# Patient Record
Sex: MALE | Race: WHITE | NOT HISPANIC OR LATINO | Employment: FULL TIME | URBAN - METROPOLITAN AREA
[De-identification: names, ages, dates, MRNs, and addresses within clinical notes are randomized per-mention and may not be internally consistent; named-entity substitution may affect disease eponyms.]

---

## 2017-10-24 ENCOUNTER — LAB VISIT (OUTPATIENT)
Dept: LAB | Facility: HOSPITAL | Age: 32
End: 2017-10-24
Attending: FAMILY MEDICINE
Payer: COMMERCIAL

## 2017-10-24 ENCOUNTER — OFFICE VISIT (OUTPATIENT)
Dept: INTERNAL MEDICINE | Facility: CLINIC | Age: 32
End: 2017-10-24
Payer: COMMERCIAL

## 2017-10-24 VITALS
HEIGHT: 71 IN | OXYGEN SATURATION: 98 % | HEART RATE: 82 BPM | WEIGHT: 170.19 LBS | BODY MASS INDEX: 23.83 KG/M2 | DIASTOLIC BLOOD PRESSURE: 86 MMHG | TEMPERATURE: 97 F | SYSTOLIC BLOOD PRESSURE: 136 MMHG | RESPIRATION RATE: 18 BRPM

## 2017-10-24 DIAGNOSIS — F41.9 ANXIETY: ICD-10-CM

## 2017-10-24 DIAGNOSIS — Z00.00 ROUTINE HEALTH MAINTENANCE: ICD-10-CM

## 2017-10-24 DIAGNOSIS — Z00.00 ROUTINE HEALTH MAINTENANCE: Primary | ICD-10-CM

## 2017-10-24 LAB
ALBUMIN SERPL BCP-MCNC: 4.3 G/DL
ALP SERPL-CCNC: 96 U/L
ALT SERPL W/O P-5'-P-CCNC: 42 U/L
ANION GAP SERPL CALC-SCNC: 11 MMOL/L
AST SERPL-CCNC: 22 U/L
BILIRUB SERPL-MCNC: 0.4 MG/DL
BUN SERPL-MCNC: 10 MG/DL
CALCIUM SERPL-MCNC: 9.3 MG/DL
CHLORIDE SERPL-SCNC: 104 MMOL/L
CO2 SERPL-SCNC: 26 MMOL/L
CREAT SERPL-MCNC: 1 MG/DL
EST. GFR  (AFRICAN AMERICAN): >60 ML/MIN/1.73 M^2
EST. GFR  (NON AFRICAN AMERICAN): >60 ML/MIN/1.73 M^2
GLUCOSE SERPL-MCNC: 91 MG/DL
POTASSIUM SERPL-SCNC: 4.1 MMOL/L
PROT SERPL-MCNC: 7.8 G/DL
SODIUM SERPL-SCNC: 141 MMOL/L
TSH SERPL DL<=0.005 MIU/L-ACNC: 1.55 UIU/ML

## 2017-10-24 PROCEDURE — 80061 LIPID PANEL: CPT

## 2017-10-24 PROCEDURE — 86592 SYPHILIS TEST NON-TREP QUAL: CPT

## 2017-10-24 PROCEDURE — 84443 ASSAY THYROID STIM HORMONE: CPT | Mod: PO

## 2017-10-24 PROCEDURE — 99385 PREV VISIT NEW AGE 18-39: CPT | Mod: S$GLB,,, | Performed by: FAMILY MEDICINE

## 2017-10-24 PROCEDURE — 86703 HIV-1/HIV-2 1 RESULT ANTBDY: CPT

## 2017-10-24 PROCEDURE — 80053 COMPREHEN METABOLIC PANEL: CPT | Mod: PO

## 2017-10-24 PROCEDURE — 36415 COLL VENOUS BLD VENIPUNCTURE: CPT | Mod: PO

## 2017-10-24 PROCEDURE — 99999 PR PBB SHADOW E&M-NEW PATIENT-LVL III: CPT | Mod: PBBFAC,,, | Performed by: FAMILY MEDICINE

## 2017-10-24 RX ORDER — DEXTROAMPHETAMINE SACCHARATE, AMPHETAMINE ASPARTATE MONOHYDRATE, DEXTROAMPHETAMINE SULFATE AND AMPHETAMINE SULFATE 3.75; 3.75; 3.75; 3.75 MG/1; MG/1; MG/1; MG/1
15 CAPSULE, EXTENDED RELEASE ORAL EVERY MORNING
Qty: 30 CAPSULE | Refills: 0 | Status: SHIPPED | OUTPATIENT
Start: 2017-10-24 | End: 2017-11-06

## 2017-10-24 RX ORDER — BUPROPION HYDROCHLORIDE 150 MG/1
TABLET, EXTENDED RELEASE ORAL
COMMUNITY
Start: 2017-05-23 | End: 2017-10-24

## 2017-10-24 NOTE — MEDICAL/APP STUDENT
Subjective:       Patient ID: Matthew Lazo is a 32 y.o. unknown.    Chief Complaint: Employment Physical and Memory Deficits (unable to complete task given and unable to stay foused)    New patient presents to establish care and complete yearly wellness exam/physical for work.     Also c/o issues focusing at work which have been pointed out and are becoming an issue with his work. Describes situations where he feels like he is easily distracted and then can't remember what he was initially working on. States he has always some issues focusing and completing tasks but it has become more pronounced. Reports he recently tried his sister's Adderall and found that he was able to focus and concentrate more easily and things felt more clear.     Patient has a 17 pack year history. No longer smokes cigarettes but is currently vaping with nicotine. Reports he has cut back on the amount of nicotine he uses but is still working to stop completely. Wellbutrin has helped in the past with cigarettes. He has also used nicotine gum as a substitute. Previously prescribed Wellbutrin to help with smoking cessation. He no longer takes it bid but takes it occasionally when having a difficult day. Has tried to stop taking it in the past and has been irritable if he does not have access to nicotine.       Review of Systems   Constitutional: Negative for chills and fever.   HENT: Negative for sneezing and sore throat.    Eyes: Negative for pain and visual disturbance.   Respiratory: Negative for chest tightness and shortness of breath.    Cardiovascular: Negative for chest pain and palpitations.   Gastrointestinal: Negative for abdominal pain and nausea.   Endocrine: Negative for cold intolerance and heat intolerance.   Genitourinary: Negative for dysuria and urgency.   Musculoskeletal: Negative for arthralgias and myalgias.   Skin: Negative for color change and rash.   Neurological: Negative for dizziness and headaches.    Psychiatric/Behavioral: Positive for agitation and decreased concentration. Negative for confusion and suicidal ideas. The patient is nervous/anxious.        Objective:      Physical Exam   Constitutional: He is oriented to person, place, and time. He appears well-developed and well-nourished.   HENT:   Head: Normocephalic and atraumatic.   Eyes: EOM are normal. Pupils are equal, round, and reactive to light.   Neck: Normal range of motion. Neck supple.   Cardiovascular: Normal rate, regular rhythm, normal heart sounds and intact distal pulses.    Pulmonary/Chest: Effort normal and breath sounds normal.   Abdominal: Soft. Bowel sounds are normal.   Musculoskeletal: Normal range of motion.   Neurological: He is alert and oriented to person, place, and time.   Skin: Skin is warm and dry.   Psychiatric: His behavior is normal. Judgment and thought content normal. His mood appears anxious. He expresses no homicidal and no suicidal ideation.   Difficulties concentrating and completing tasks He is inattentive.       Assessment:       1. Routine health maintenance    2. Anxiety        Plan:       # Difficulty Concentrating/ Adult ADD  - Unsure if issues are due to depression or causing some anxiety and depression   - Depression screening performed --> Moderate depression. Will repeat in one month after Adderall trial   - 1 month trial of Adderall 15mg ER    # Routine Health Maintenance  - CMP today  - TSH today  - Lipid panel today  - RPR, HIV 1/2 today     Patient discussed with and seen by Dr. Derian Laughlin, MS4

## 2017-10-24 NOTE — PROGRESS NOTES
Subjective:       Patient ID: Matthew Lazo is a 32 y.o. unknown.    Chief Complaint: Employment Physical and Memory Deficits (unable to complete task given and unable to stay foused)    HPI    New patient presents to establish care and complete yearly wellness exam/physical for work.      Also c/o issues focusing at work which have been pointed out and are becoming an issue with his work. Describes situations where he feels like he is easily distracted and then can't remember what he was initially working on. States he has always some issues focusing and completing tasks but it has become more pronounced. Reports he recently tried his sister's Adderall and found that he was able to focus and concentrate more easily and things felt more clear.      Patient has a 17 pack year history. No longer smokes cigarettes but is currently vaping with nicotine. Reports he has cut back on the amount of nicotine he uses but is still working to stop completely. Wellbutrin has helped in the past with cigarettes. He has also used nicotine gum as a substitute. Previously prescribed Wellbutrin to help with smoking cessation. He no longer takes it bid but takes it occasionally when having a difficult day. Has tried to stop taking it in the past and has been irritable if he does not have access to nicotine.      Little interest or pleasure in doing things 3 - Nearly every day    Feeling down, depressed, or hopeless 2 - More than half the days    Trouble falling or staying asleep, or sleeping too much 3 - Nearly every day    Feeling tired or having little energy 1 - Several days    Poor appetite or overeating 0 - Not at all    Feeling bad about yourself - or that you are a failure or have let yourself or your family down. 2 - More than half the days    Trouble concentrating on things, such as reading the newpaper or watching television. 3 - Nearly every day    Moving or speaking so slowly that other people could have noticed?  Or the  "opposite - being so fidgety or restless that you have been moving around a lot more than ususal 3 - Nearly every day    Thoughts that you would be better off dead or of hurting yourself in some way. 0 - Not at all    If you checked off any problems, how difficult have these problems made it for you to do your work, take care of things at hoe, or get along with other people? Very difficult        There is no problem list on file for this patient.      Family History   Problem Relation Age of Onset    No Known Problems Mother     Hypertension Father     Alcohol abuse Father     ADD / ADHD Sister     Cancer Paternal Grandfather     Leukemia Paternal Grandfather      History reviewed. No pertinent surgical history.      Current Outpatient Prescriptions:     buPROPion (WELLBUTRIN SR) 150 MG TBSR 12 hr tablet, TAKE 1 TABLET BY MOUTH TWICE DAILY, Disp: , Rfl:     Review of Systems    Constitutional: Negative for chills and fever.   HENT: Negative for sneezing and sore throat.    Eyes: Negative for pain and visual disturbance.   Respiratory: Negative for chest tightness and shortness of breath.    Cardiovascular: Negative for chest pain and palpitations.   Gastrointestinal: Negative for abdominal pain and nausea.   Endocrine: Negative for cold intolerance and heat intolerance.   Genitourinary: Negative for dysuria and urgency.   Musculoskeletal: Negative for arthralgias and myalgias.   Skin: Negative for color change and rash.   Neurological: Negative for dizziness and headaches.   Psychiatric/Behavioral: Positive for agitation and decreased concentration. Negative for confusion and suicidal ideas. The patient is nervous/anxious.      Objective:   /86 (BP Location: Left arm, Patient Position: Sitting, BP Method: Large (Automatic))   Pulse 82   Temp 96.5 °F (35.8 °C) (Tympanic)   Resp 18   Ht 5' 11" (1.803 m)   Wt 77.2 kg (170 lb 3.1 oz)   SpO2 98%   BMI 23.74 kg/m²      Physical Exam    Constitutional: " He is oriented to person, place, and time. He appears well-developed and well-nourished.   HENT:   Head: Normocephalic and atraumatic.   Eyes: EOM are normal. Pupils are equal, round, and reactive to light.   Neck: Normal range of motion. Neck supple.   Cardiovascular: Normal rate, regular rhythm, normal heart sounds and intact distal pulses.    Pulmonary/Chest: Effort normal and breath sounds normal.   Abdominal: Soft. Bowel sounds are normal.   Musculoskeletal: Normal range of motion.   Neurological: He is alert and oriented to person, place, and time.   Skin: Skin is warm and dry.   Psychiatric: His behavior is normal. Judgment and thought content normal. His mood appears anxious. He expresses no homicidal and no suicidal ideation.   Difficulties concentrating and completing tasks He is inattentive.     Assessment & Plan     1. Routine health maintenance  Getting routine screening for annual exam.  No significant problems identified from physical exam nor his history.  We will need to discuss smoking cessation in the future but first we will focus on the inattention and anxiety issues.  - Comprehensive metabolic panel; Future  - Lipid panel; Future  - HIV-1 and HIV-2 antibodies; Future  - RPR; Future    2. Anxiety  After discussing with him in detail it seems that his in attention is causing him to be anxious about losing his job and depressed.  Since he has had some recent success with using Adderall and this seems to be more the foundational problem, will do extended release of 15 mg daily for the next month to see if this is improving his performance at work.  May need to consider treating underlying depression or anxiety if he is not making progress.  - TSH; Future

## 2017-10-25 LAB
CHOLEST SERPL-MCNC: 219 MG/DL
CHOLEST/HDLC SERPL: 3.9 {RATIO}
HDLC SERPL-MCNC: 56 MG/DL
HDLC SERPL: 25.6 %
HIV 1+2 AB+HIV1 P24 AG SERPL QL IA: NEGATIVE
LDLC SERPL CALC-MCNC: 128.4 MG/DL
NONHDLC SERPL-MCNC: 163 MG/DL
RPR SER QL: NORMAL
TRIGL SERPL-MCNC: 173 MG/DL

## 2017-11-03 ENCOUNTER — PATIENT MESSAGE (OUTPATIENT)
Dept: INTERNAL MEDICINE | Facility: CLINIC | Age: 32
End: 2017-11-03

## 2017-11-06 ENCOUNTER — PATIENT MESSAGE (OUTPATIENT)
Dept: INTERNAL MEDICINE | Facility: CLINIC | Age: 32
End: 2017-11-06

## 2017-11-06 RX ORDER — DEXTROAMPHETAMINE SACCHARATE, AMPHETAMINE ASPARTATE, DEXTROAMPHETAMINE SULFATE AND AMPHETAMINE SULFATE 5; 5; 5; 5 MG/1; MG/1; MG/1; MG/1
1 TABLET ORAL 2 TIMES DAILY PRN
Qty: 60 TABLET | Refills: 0 | Status: SHIPPED | OUTPATIENT
Start: 2017-11-06 | End: 2017-11-30 | Stop reason: SDUPTHER

## 2017-11-30 ENCOUNTER — PATIENT MESSAGE (OUTPATIENT)
Dept: INTERNAL MEDICINE | Facility: CLINIC | Age: 32
End: 2017-11-30

## 2017-11-30 RX ORDER — DEXTROAMPHETAMINE SACCHARATE, AMPHETAMINE ASPARTATE, DEXTROAMPHETAMINE SULFATE AND AMPHETAMINE SULFATE 5; 5; 5; 5 MG/1; MG/1; MG/1; MG/1
1 TABLET ORAL 2 TIMES DAILY PRN
Qty: 60 TABLET | Refills: 0 | Status: SHIPPED | OUTPATIENT
Start: 2017-11-30 | End: 2017-12-27 | Stop reason: SDUPTHER

## 2017-12-04 ENCOUNTER — PATIENT OUTREACH (OUTPATIENT)
Dept: ADMINISTRATIVE | Facility: HOSPITAL | Age: 32
End: 2017-12-04

## 2017-12-05 ENCOUNTER — OFFICE VISIT (OUTPATIENT)
Dept: INTERNAL MEDICINE | Facility: CLINIC | Age: 32
End: 2017-12-05
Payer: COMMERCIAL

## 2017-12-05 VITALS
WEIGHT: 160.06 LBS | DIASTOLIC BLOOD PRESSURE: 80 MMHG | HEART RATE: 89 BPM | SYSTOLIC BLOOD PRESSURE: 130 MMHG | HEIGHT: 72 IN | TEMPERATURE: 97 F | RESPIRATION RATE: 16 BRPM | OXYGEN SATURATION: 99 % | BODY MASS INDEX: 21.68 KG/M2

## 2017-12-05 DIAGNOSIS — F98.8 ATTENTION DEFICIT DISORDER (ADD) WITHOUT HYPERACTIVITY: Primary | ICD-10-CM

## 2017-12-05 PROCEDURE — 99213 OFFICE O/P EST LOW 20 MIN: CPT | Mod: S$GLB,,, | Performed by: FAMILY MEDICINE

## 2017-12-05 PROCEDURE — 99999 PR PBB SHADOW E&M-EST. PATIENT-LVL III: CPT | Mod: PBBFAC,,, | Performed by: FAMILY MEDICINE

## 2017-12-05 NOTE — PROGRESS NOTES
"Subjective:       Patient ID: Matthew Lazo is a 32 y.o. male.    Chief Complaint: Follow-up    HPI  Patient here today to follow-up on initiation of Adderall.  He has had a tremendous improvement in work performance both appreciated by himself and his boss.  He feels like he is getting things done much more efficiently and is having less anxiety about work.  He would like to continue the same dose of medication due to this improvement.    There is no problem list on file for this patient.      Family History   Problem Relation Age of Onset    No Known Problems Mother     Hypertension Father     Alcohol abuse Father     ADD / ADHD Sister     Cancer Paternal Grandfather     Leukemia Paternal Grandfather      History reviewed. No pertinent surgical history.      Current Outpatient Prescriptions:     dextroamphetamine-amphetamine (ADDERALL) 20 mg tablet, Take 1 tablet by mouth 2 (two) times daily as needed., Disp: 60 tablet, Rfl: 0    Review of Systems   Constitutional: Negative for activity change and unexpected weight change.   HENT: Negative for hearing loss, rhinorrhea and trouble swallowing.    Eyes: Negative for discharge and visual disturbance.   Respiratory: Negative for chest tightness and wheezing.    Cardiovascular: Negative for chest pain and palpitations.   Gastrointestinal: Negative for blood in stool, constipation, diarrhea and vomiting.   Endocrine: Negative for polydipsia and polyuria.   Genitourinary: Negative for difficulty urinating, hematuria and urgency.   Musculoskeletal: Negative for arthralgias, joint swelling and neck pain.   Neurological: Negative for weakness and headaches.   Psychiatric/Behavioral: Negative for confusion and dysphoric mood.       Objective:   /80   Pulse 89   Temp 97.2 °F (36.2 °C) (Tympanic)   Resp 16   Ht 5' 11.5" (1.816 m)   Wt 72.6 kg (160 lb 0.9 oz)   SpO2 99%   BMI 22.01 kg/m²      Physical Exam   Constitutional: He is oriented to person, place, " and time. He appears well-developed and well-nourished.   HENT:   Head: Normocephalic and atraumatic.   Eyes: Conjunctivae are normal.   Cardiovascular: Normal rate.    Pulmonary/Chest: Effort normal. No respiratory distress.   Musculoskeletal: He exhibits no edema.   Neurological: He is alert and oriented to person, place, and time. Coordination normal.   Skin: Skin is warm and dry. No rash noted.   Psychiatric: He has a normal mood and affect. His behavior is normal.   Vitals reviewed.      Assessment & Plan      1. Attention deficit disorder (ADD) without hyperactivity  At improved performance with medication.  We'll plan to continue this indefinitely for now.  Plan to have 3 month follow-ups to verify tolerance to medication and to send refills.  He will investigate [facility] and we may start sending 90 day supplies to the pharmacy to help with affordability.

## 2017-12-28 RX ORDER — DEXTROAMPHETAMINE SACCHARATE, AMPHETAMINE ASPARTATE, DEXTROAMPHETAMINE SULFATE AND AMPHETAMINE SULFATE 5; 5; 5; 5 MG/1; MG/1; MG/1; MG/1
1 TABLET ORAL 2 TIMES DAILY PRN
Qty: 60 TABLET | Refills: 0 | Status: SHIPPED | OUTPATIENT
Start: 2017-12-28 | End: 2018-01-24 | Stop reason: SDUPTHER

## 2018-01-24 ENCOUNTER — PATIENT MESSAGE (OUTPATIENT)
Dept: INTERNAL MEDICINE | Facility: CLINIC | Age: 33
End: 2018-01-24

## 2018-01-24 RX ORDER — DEXTROAMPHETAMINE SACCHARATE, AMPHETAMINE ASPARTATE, DEXTROAMPHETAMINE SULFATE AND AMPHETAMINE SULFATE 5; 5; 5; 5 MG/1; MG/1; MG/1; MG/1
1 TABLET ORAL 2 TIMES DAILY PRN
Qty: 60 TABLET | Refills: 0 | Status: SHIPPED | OUTPATIENT
Start: 2018-01-24 | End: 2018-02-19 | Stop reason: SDUPTHER

## 2018-02-19 RX ORDER — DEXTROAMPHETAMINE SACCHARATE, AMPHETAMINE ASPARTATE, DEXTROAMPHETAMINE SULFATE AND AMPHETAMINE SULFATE 5; 5; 5; 5 MG/1; MG/1; MG/1; MG/1
1 TABLET ORAL 2 TIMES DAILY PRN
Qty: 60 TABLET | Refills: 0 | Status: SHIPPED | OUTPATIENT
Start: 2018-02-19 | End: 2018-03-13 | Stop reason: SDUPTHER

## 2018-03-12 ENCOUNTER — PATIENT MESSAGE (OUTPATIENT)
Dept: INTERNAL MEDICINE | Facility: CLINIC | Age: 33
End: 2018-03-12

## 2018-03-12 RX ORDER — DEXTROAMPHETAMINE SACCHARATE, AMPHETAMINE ASPARTATE, DEXTROAMPHETAMINE SULFATE AND AMPHETAMINE SULFATE 5; 5; 5; 5 MG/1; MG/1; MG/1; MG/1
1 TABLET ORAL 2 TIMES DAILY PRN
Qty: 60 TABLET | Refills: 0 | OUTPATIENT
Start: 2018-03-12 | End: 2018-04-11

## 2018-03-13 ENCOUNTER — OFFICE VISIT (OUTPATIENT)
Dept: INTERNAL MEDICINE | Facility: CLINIC | Age: 33
End: 2018-03-13
Payer: COMMERCIAL

## 2018-03-13 VITALS
WEIGHT: 153 LBS | HEIGHT: 72 IN | SYSTOLIC BLOOD PRESSURE: 126 MMHG | TEMPERATURE: 96 F | OXYGEN SATURATION: 100 % | RESPIRATION RATE: 16 BRPM | BODY MASS INDEX: 20.72 KG/M2 | HEART RATE: 85 BPM | DIASTOLIC BLOOD PRESSURE: 74 MMHG

## 2018-03-13 DIAGNOSIS — F98.8 ATTENTION DEFICIT DISORDER (ADD) WITHOUT HYPERACTIVITY: Primary | ICD-10-CM

## 2018-03-13 DIAGNOSIS — F17.290 OTHER TOBACCO PRODUCT NICOTINE DEPENDENCE, UNCOMPLICATED: ICD-10-CM

## 2018-03-13 PROBLEM — F17.200 NICOTINE DEPENDENCE, UNCOMPLICATED: Status: ACTIVE | Noted: 2018-03-13

## 2018-03-13 PROCEDURE — 99999 PR PBB SHADOW E&M-EST. PATIENT-LVL III: CPT | Mod: PBBFAC,,, | Performed by: FAMILY MEDICINE

## 2018-03-13 PROCEDURE — 99213 OFFICE O/P EST LOW 20 MIN: CPT | Mod: PBBFAC,PO | Performed by: FAMILY MEDICINE

## 2018-03-13 PROCEDURE — 99214 OFFICE O/P EST MOD 30 MIN: CPT | Mod: S$PBB,,, | Performed by: FAMILY MEDICINE

## 2018-03-13 RX ORDER — DEXTROAMPHETAMINE SACCHARATE, AMPHETAMINE ASPARTATE, DEXTROAMPHETAMINE SULFATE AND AMPHETAMINE SULFATE 5; 5; 5; 5 MG/1; MG/1; MG/1; MG/1
1 TABLET ORAL 2 TIMES DAILY PRN
Qty: 60 TABLET | Refills: 0 | Status: SHIPPED | OUTPATIENT
Start: 2018-03-13 | End: 2018-04-12 | Stop reason: SDUPTHER

## 2018-03-13 NOTE — PROGRESS NOTES
"Subjective:       Patient ID: Matthew Lazo is a 32 y.o. male.    Chief Complaint: Medication Refill    HPI  Here today for routine follow-up on treatment of ADD.  He has been doing well on this medicine and feels that is certainly helping with his job.  He has had some changes in his appointment but is pretty happy where he is now and excited about the new opportunities.  Has not had any complaints from his bosses or any other supervisors.  He has sometimes been taking 1 tablet of Adderall in the morning and he will break it in half to take a half an afternoon and save the other half for later in the day if he has extra work to get done.  Not having any negative side effects such as palpitations nor chest pain.  No issues with weight.    Family History   Problem Relation Age of Onset    No Known Problems Mother     Hypertension Father     Alcohol abuse Father     ADD / ADHD Sister     Cancer Paternal Grandfather     Leukemia Paternal Grandfather        Current Outpatient Prescriptions:     dextroamphetamine-amphetamine (ADDERALL) 20 mg tablet, Take 1 tablet by mouth 2 (two) times daily as needed., Disp: 60 tablet, Rfl: 0    Review of Systems   Constitutional: Negative for chills and fever.   Eyes: Negative for visual disturbance.   Respiratory: Negative for cough and shortness of breath.    Cardiovascular: Negative for chest pain.   Gastrointestinal: Negative for abdominal pain.   Neurological: Negative for dizziness.       Objective:   /74   Pulse 85   Temp 96 °F (35.6 °C) (Tympanic)   Resp 16   Ht 5' 11.5" (1.816 m)   Wt 69.4 kg (153 lb)   SpO2 100%   BMI 21.04 kg/m²      Physical Exam   Constitutional: He is oriented to person, place, and time. He appears well-developed and well-nourished.   HENT:   Head: Normocephalic and atraumatic.   Eyes: Conjunctivae are normal.   Cardiovascular: Normal rate.    Pulmonary/Chest: Effort normal. No respiratory distress.   Musculoskeletal: He exhibits no " edema.   Neurological: He is alert and oriented to person, place, and time. Coordination normal.   Skin: Skin is warm and dry. No rash noted.   Psychiatric: He has a normal mood and affect. His behavior is normal.   Vitals reviewed.      Assessment & Plan     Problem List Items Addressed This Visit        Psychiatric    Attention deficit disorder (ADD) without hyperactivity - Primary    Current Assessment & Plan     Advised him to continue on the same dose.  I okayed him to break his afternoon dose in half if need be but not to take more than 2 tablets in a day.            Other    Nicotine dependence, uncomplicated    Current Assessment & Plan     He does continue to vape.  I recommended that he try tapering down on the nicotine amount and the vaping.                 Follow-up in about 3 months (around 6/13/2018).

## 2018-03-14 ENCOUNTER — PATIENT MESSAGE (OUTPATIENT)
Dept: INTERNAL MEDICINE | Facility: CLINIC | Age: 33
End: 2018-03-14

## 2018-03-15 NOTE — ASSESSMENT & PLAN NOTE
He does continue to vape.  I recommended that he try tapering down on the nicotine amount and the vaping.

## 2018-03-15 NOTE — ASSESSMENT & PLAN NOTE
Advised him to continue on the same dose.  I okayed him to break his afternoon dose in half if need be but not to take more than 2 tablets in a day.

## 2018-04-12 RX ORDER — DEXTROAMPHETAMINE SACCHARATE, AMPHETAMINE ASPARTATE, DEXTROAMPHETAMINE SULFATE AND AMPHETAMINE SULFATE 5; 5; 5; 5 MG/1; MG/1; MG/1; MG/1
1 TABLET ORAL 2 TIMES DAILY PRN
Qty: 60 TABLET | Refills: 0 | Status: SHIPPED | OUTPATIENT
Start: 2018-04-12 | End: 2018-04-16 | Stop reason: SDUPTHER

## 2018-04-14 ENCOUNTER — PATIENT MESSAGE (OUTPATIENT)
Dept: INTERNAL MEDICINE | Facility: CLINIC | Age: 33
End: 2018-04-14

## 2018-04-16 RX ORDER — DEXTROAMPHETAMINE SACCHARATE, AMPHETAMINE ASPARTATE, DEXTROAMPHETAMINE SULFATE AND AMPHETAMINE SULFATE 5; 5; 5; 5 MG/1; MG/1; MG/1; MG/1
1 TABLET ORAL 2 TIMES DAILY PRN
Qty: 60 TABLET | Refills: 0 | Status: SHIPPED | OUTPATIENT
Start: 2018-04-16 | End: 2018-05-14 | Stop reason: SDUPTHER

## 2018-05-15 RX ORDER — DEXTROAMPHETAMINE SACCHARATE, AMPHETAMINE ASPARTATE, DEXTROAMPHETAMINE SULFATE AND AMPHETAMINE SULFATE 5; 5; 5; 5 MG/1; MG/1; MG/1; MG/1
1 TABLET ORAL 2 TIMES DAILY PRN
Qty: 60 TABLET | Refills: 0 | Status: SHIPPED | OUTPATIENT
Start: 2018-05-15 | End: 2018-06-14 | Stop reason: SDUPTHER

## 2018-06-14 ENCOUNTER — OFFICE VISIT (OUTPATIENT)
Dept: INTERNAL MEDICINE | Facility: CLINIC | Age: 33
End: 2018-06-14
Payer: COMMERCIAL

## 2018-06-14 VITALS
DIASTOLIC BLOOD PRESSURE: 76 MMHG | BODY MASS INDEX: 21.17 KG/M2 | RESPIRATION RATE: 16 BRPM | HEART RATE: 81 BPM | TEMPERATURE: 96 F | SYSTOLIC BLOOD PRESSURE: 122 MMHG | HEIGHT: 72 IN | OXYGEN SATURATION: 98 % | WEIGHT: 156.31 LBS

## 2018-06-14 DIAGNOSIS — F17.210 CIGARETTE NICOTINE DEPENDENCE WITHOUT COMPLICATION: ICD-10-CM

## 2018-06-14 DIAGNOSIS — F98.8 ATTENTION DEFICIT DISORDER (ADD) WITHOUT HYPERACTIVITY: Primary | ICD-10-CM

## 2018-06-14 PROCEDURE — 99406 BEHAV CHNG SMOKING 3-10 MIN: CPT | Mod: S$GLB,,, | Performed by: FAMILY MEDICINE

## 2018-06-14 PROCEDURE — 3008F BODY MASS INDEX DOCD: CPT | Mod: CPTII,S$GLB,, | Performed by: FAMILY MEDICINE

## 2018-06-14 PROCEDURE — 99213 OFFICE O/P EST LOW 20 MIN: CPT | Mod: S$GLB,,, | Performed by: FAMILY MEDICINE

## 2018-06-14 PROCEDURE — 99999 PR PBB SHADOW E&M-EST. PATIENT-LVL III: CPT | Mod: PBBFAC,,, | Performed by: FAMILY MEDICINE

## 2018-06-14 RX ORDER — DEXTROAMPHETAMINE SACCHARATE, AMPHETAMINE ASPARTATE, DEXTROAMPHETAMINE SULFATE AND AMPHETAMINE SULFATE 5; 5; 5; 5 MG/1; MG/1; MG/1; MG/1
1 TABLET ORAL 2 TIMES DAILY PRN
Qty: 60 TABLET | Refills: 0 | Status: SHIPPED | OUTPATIENT
Start: 2018-06-14 | End: 2018-07-13 | Stop reason: SDUPTHER

## 2018-06-14 NOTE — ASSESSMENT & PLAN NOTE
Well managed on current meds. Maintaining weight and no issues with tachycardia. Continue on same medication with 3 month follow up

## 2018-06-14 NOTE — ASSESSMENT & PLAN NOTE
Counseled for 4 minutes on the importance of complete smoking cessation. He smokes a mix of cigarettes and vapes and has been able to taper down his use of nicotine in the vaping. Advised him to pick a quit day for when he plans to stop completely and told him that even if he fails he needs to try again

## 2018-06-14 NOTE — PROGRESS NOTES
"Subjective:       Patient ID: Matthew Lazo is a 33 y.o. male.    Chief Complaint: Follow-up (ADD)    HPI    J Luis came in today to follow-up on ADD treatment.  Has been doing quite well on the same dose of Adderall for several months now.  His job performance has improved and he has had some promotions.  No negative side effects associated with the medication.  No issues with weight loss and no palpitations.      We also discussed his current tobacco use.  He does continue to smoke cigarettes although less than before.  He often will vape and has tapered down on the nicotine content in that.  Family History   Problem Relation Age of Onset    No Known Problems Mother     Hypertension Father     Alcohol abuse Father     ADD / ADHD Sister     Cancer Paternal Grandfather     Leukemia Paternal Grandfather        Current Outpatient Prescriptions:     dextroamphetamine-amphetamine (ADDERALL) 20 mg tablet, Take 1 tablet by mouth 2 (two) times daily as needed., Disp: 60 tablet, Rfl: 0    Review of Systems   Constitutional: Negative for activity change and unexpected weight change.   HENT: Negative for hearing loss, rhinorrhea and trouble swallowing.    Eyes: Negative for discharge and visual disturbance.   Respiratory: Negative for chest tightness and wheezing.    Cardiovascular: Negative for chest pain and palpitations.   Gastrointestinal: Negative for blood in stool, constipation, diarrhea and vomiting.   Endocrine: Negative for polydipsia and polyuria.   Genitourinary: Negative for difficulty urinating, hematuria and urgency.   Musculoskeletal: Negative for arthralgias, joint swelling and neck pain.   Neurological: Negative for weakness and headaches.   Psychiatric/Behavioral: Negative for confusion and dysphoric mood.       Objective:   /76 (BP Location: Left arm, Patient Position: Sitting, BP Method: Large (Manual))   Pulse 81   Temp 96 °F (35.6 °C) (Tympanic)   Resp 16   Ht 5' 11.5" (1.816 m)   Wt " 70.9 kg (156 lb 4.9 oz)   SpO2 98%   BMI 21.50 kg/m²      Physical Exam   Constitutional: He is oriented to person, place, and time. He appears well-developed and well-nourished.   HENT:   Head: Normocephalic and atraumatic.   Eyes: Conjunctivae are normal.   Cardiovascular: Normal rate.    Pulmonary/Chest: Effort normal. No respiratory distress.   Musculoskeletal: He exhibits no edema.   Neurological: He is alert and oriented to person, place, and time. Coordination normal.   Skin: Skin is warm and dry. No rash noted.   Psychiatric: He has a normal mood and affect. His behavior is normal.   Vitals reviewed.      Assessment & Plan     Problem List Items Addressed This Visit        Psychiatric    Attention deficit disorder (ADD) without hyperactivity - Primary    Current Assessment & Plan     Well managed on current meds. Maintaining weight and no issues with tachycardia. Continue on same medication with 3 month follow up            Other    Nicotine dependence, uncomplicated    Current Assessment & Plan     Counseled for 4 minutes on the importance of complete smoking cessation. He smokes a mix of cigarettes and vapes and has been able to taper down his use of nicotine in the vaping. Advised him to pick a quit day for when he plans to stop completely and told him that even if he fails he needs to try again                 No Follow-up on file.

## 2018-07-13 RX ORDER — DEXTROAMPHETAMINE SACCHARATE, AMPHETAMINE ASPARTATE, DEXTROAMPHETAMINE SULFATE AND AMPHETAMINE SULFATE 5; 5; 5; 5 MG/1; MG/1; MG/1; MG/1
1 TABLET ORAL 2 TIMES DAILY PRN
Qty: 60 TABLET | Refills: 0 | Status: SHIPPED | OUTPATIENT
Start: 2018-07-13 | End: 2018-08-13 | Stop reason: SDUPTHER

## 2018-08-13 RX ORDER — DEXTROAMPHETAMINE SACCHARATE, AMPHETAMINE ASPARTATE, DEXTROAMPHETAMINE SULFATE AND AMPHETAMINE SULFATE 5; 5; 5; 5 MG/1; MG/1; MG/1; MG/1
1 TABLET ORAL 2 TIMES DAILY PRN
Qty: 60 TABLET | Refills: 0 | Status: SHIPPED | OUTPATIENT
Start: 2018-08-13 | End: 2018-09-13 | Stop reason: SDUPTHER

## 2018-09-13 ENCOUNTER — OFFICE VISIT (OUTPATIENT)
Dept: INTERNAL MEDICINE | Facility: CLINIC | Age: 33
End: 2018-09-13
Payer: COMMERCIAL

## 2018-09-13 VITALS
SYSTOLIC BLOOD PRESSURE: 114 MMHG | RESPIRATION RATE: 18 BRPM | HEIGHT: 72 IN | OXYGEN SATURATION: 98 % | BODY MASS INDEX: 20.69 KG/M2 | WEIGHT: 152.75 LBS | DIASTOLIC BLOOD PRESSURE: 70 MMHG | HEART RATE: 89 BPM | TEMPERATURE: 96 F

## 2018-09-13 DIAGNOSIS — A63.0 GENITAL WARTS DUE TO HPV (HUMAN PAPILLOMAVIRUS): ICD-10-CM

## 2018-09-13 DIAGNOSIS — F98.8 ATTENTION DEFICIT DISORDER (ADD) WITHOUT HYPERACTIVITY: Primary | ICD-10-CM

## 2018-09-13 PROCEDURE — 99999 PR PBB SHADOW E&M-EST. PATIENT-LVL III: CPT | Mod: PBBFAC,,, | Performed by: FAMILY MEDICINE

## 2018-09-13 PROCEDURE — 99214 OFFICE O/P EST MOD 30 MIN: CPT | Mod: S$GLB,,, | Performed by: FAMILY MEDICINE

## 2018-09-13 PROCEDURE — 3008F BODY MASS INDEX DOCD: CPT | Mod: CPTII,S$GLB,, | Performed by: FAMILY MEDICINE

## 2018-09-13 RX ORDER — IMIQUIMOD 12.5 MG/.25G
CREAM TOPICAL
Qty: 24 PACKET | Refills: 0 | Status: SHIPPED | OUTPATIENT
Start: 2018-09-13 | End: 2018-09-20 | Stop reason: SDUPTHER

## 2018-09-13 RX ORDER — DEXTROAMPHETAMINE SACCHARATE, AMPHETAMINE ASPARTATE, DEXTROAMPHETAMINE SULFATE AND AMPHETAMINE SULFATE 5; 5; 5; 5 MG/1; MG/1; MG/1; MG/1
1 TABLET ORAL 2 TIMES DAILY PRN
Qty: 60 TABLET | Refills: 0 | Status: SHIPPED | OUTPATIENT
Start: 2018-09-13 | End: 2018-10-10 | Stop reason: SDUPTHER

## 2018-09-13 NOTE — PROGRESS NOTES
Subjective:       Patient ID: Matthew Lazo is a 33 y.o. male.    Chief Complaint: ADHD    HPI  Here today with no acute complaints but here doing routine follow-up for treatment of ADD.  He has been taking 20 mg of immediate release of Adderall for the last year.  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  Very satisfied with his performance and his job at this time.  He is wondering if he can taper down on the dosage.  Also mention that he noticed maybe a few weeks ago whenever he trimmed his pubic hair that he has some small warts.  He is not sure how long they have been there nor where he may have contracted them from.  There only at the base of the penis more focused on the left side.  No warts around scrotum at all.  Has not had similar issues before in the past.  No recent STD screening.  Will be following up in October for routine physical so will do STD screening at that time.    Family History   Problem Relation Age of Onset    No Known Problems Mother     Hypertension Father     Alcohol abuse Father     ADD / ADHD Sister     Cancer Paternal Grandfather     Leukemia Paternal Grandfather        Current Outpatient Medications:     dextroamphetamine-amphetamine (ADDERALL) 20 mg tablet, Take 1 tablet by mouth 2 (two) times daily as needed., Disp: 60 tablet, Rfl: 0    imiquimod (ALDARA) 5 % cream, Use 3x per week at night until resolution. Maximum 16 weeks, Disp: 24 packet, Rfl: 0    Review of Systems   Constitutional: Negative for activity change and unexpected weight change.   HENT: Negative for hearing loss, rhinorrhea and trouble swallowing.    Eyes: Negative for discharge and visual disturbance.   Respiratory: Negative for chest tightness and wheezing.    Cardiovascular: Negative for chest pain and palpitations.   Gastrointestinal: Negative for blood in stool, constipation, diarrhea and vomiting.   Endocrine: Negative for polydipsia and polyuria.  "  Genitourinary: Negative for difficulty urinating, hematuria and urgency.   Musculoskeletal: Negative for arthralgias, joint swelling and neck pain.   Skin:        Warts in genital area   Neurological: Negative for weakness and headaches.   Psychiatric/Behavioral: Negative for confusion and dysphoric mood.       Objective:   /70 (BP Location: Right arm, Patient Position: Sitting, BP Method: Medium (Manual))   Pulse 89   Temp 96.2 °F (35.7 °C) (Tympanic)   Resp 18   Ht 5' 11.5" (1.816 m)   Wt 69.3 kg (152 lb 12.5 oz)   SpO2 98%   BMI 21.01 kg/m²      Physical Exam   Constitutional: He is oriented to person, place, and time. He appears well-developed and well-nourished.   HENT:   Head: Normocephalic and atraumatic.   Eyes: Conjunctivae are normal.   Cardiovascular: Normal rate.   Pulmonary/Chest: Effort normal. No respiratory distress.   Musculoskeletal: He exhibits no edema.   Neurological: He is alert and oriented to person, place, and time. Coordination normal.   Skin: Skin is warm and dry. No rash noted.   Genital warts at base of penis.  They are slightly darker than surrounding skin.   Psychiatric: He has a normal mood and affect. His behavior is normal.   Vitals reviewed.      Assessment & Plan     Problem List Items Addressed This Visit        Psychiatric    Attention deficit disorder (ADD) without hyperactivity - Primary    Current Assessment & Plan     Doing well.  Continue on same dose for now but he is planning to try tapering and breaking in half his afternoon dose.  I encouraged this and recommended continue tapering if he is able to tolerate.            ID    Genital warts due to HPV (human papillomavirus)    Current Assessment & Plan     Treating with Aldara.  Counseled on potential increased skin sensitivity around the area so focus on getting the Aldara only on the lesion.                 No Follow-up on file.  "

## 2018-09-13 NOTE — ASSESSMENT & PLAN NOTE
Treating with Aldara.  Counseled on potential increased skin sensitivity around the area so focus on getting the Aldara only on the lesion.

## 2018-09-13 NOTE — ASSESSMENT & PLAN NOTE
Doing well.  Continue on same dose for now but he is planning to try tapering and breaking in half his afternoon dose.  I encouraged this and recommended continue tapering if he is able to tolerate.

## 2018-09-20 RX ORDER — IMIQUIMOD 12.5 MG/.25G
CREAM TOPICAL
Qty: 24 PACKET | Refills: 0 | Status: SHIPPED | OUTPATIENT
Start: 2018-09-20 | End: 2018-12-28

## 2018-10-10 RX ORDER — DEXTROAMPHETAMINE SACCHARATE, AMPHETAMINE ASPARTATE, DEXTROAMPHETAMINE SULFATE AND AMPHETAMINE SULFATE 5; 5; 5; 5 MG/1; MG/1; MG/1; MG/1
1 TABLET ORAL 2 TIMES DAILY PRN
Qty: 60 TABLET | Refills: 0 | Status: SHIPPED | OUTPATIENT
Start: 2018-10-10 | End: 2018-11-05 | Stop reason: SDUPTHER

## 2018-10-11 ENCOUNTER — PATIENT MESSAGE (OUTPATIENT)
Dept: INTERNAL MEDICINE | Facility: CLINIC | Age: 33
End: 2018-10-11

## 2018-10-15 ENCOUNTER — LAB VISIT (OUTPATIENT)
Dept: LAB | Facility: HOSPITAL | Age: 33
End: 2018-10-15
Attending: FAMILY MEDICINE
Payer: COMMERCIAL

## 2018-10-15 ENCOUNTER — OFFICE VISIT (OUTPATIENT)
Dept: INTERNAL MEDICINE | Facility: CLINIC | Age: 33
End: 2018-10-15
Payer: COMMERCIAL

## 2018-10-15 VITALS
HEIGHT: 72 IN | BODY MASS INDEX: 20.9 KG/M2 | RESPIRATION RATE: 18 BRPM | DIASTOLIC BLOOD PRESSURE: 76 MMHG | OXYGEN SATURATION: 100 % | WEIGHT: 154.31 LBS | TEMPERATURE: 97 F | HEART RATE: 86 BPM | SYSTOLIC BLOOD PRESSURE: 124 MMHG

## 2018-10-15 DIAGNOSIS — Z00.00 ROUTINE HEALTH MAINTENANCE: Primary | ICD-10-CM

## 2018-10-15 DIAGNOSIS — Z00.00 ROUTINE HEALTH MAINTENANCE: ICD-10-CM

## 2018-10-15 LAB
ALBUMIN SERPL BCP-MCNC: 4.5 G/DL
ALP SERPL-CCNC: 106 U/L
ALT SERPL W/O P-5'-P-CCNC: 47 U/L
ANION GAP SERPL CALC-SCNC: 10 MMOL/L
AST SERPL-CCNC: 28 U/L
BILIRUB SERPL-MCNC: 0.7 MG/DL
BUN SERPL-MCNC: 11 MG/DL
CALCIUM SERPL-MCNC: 9.3 MG/DL
CHLORIDE SERPL-SCNC: 103 MMOL/L
CO2 SERPL-SCNC: 29 MMOL/L
CREAT SERPL-MCNC: 1 MG/DL
EST. GFR  (AFRICAN AMERICAN): >60 ML/MIN/1.73 M^2
EST. GFR  (NON AFRICAN AMERICAN): >60 ML/MIN/1.73 M^2
GLUCOSE SERPL-MCNC: 85 MG/DL
POTASSIUM SERPL-SCNC: 4 MMOL/L
PROT SERPL-MCNC: 7.8 G/DL
SODIUM SERPL-SCNC: 142 MMOL/L

## 2018-10-15 PROCEDURE — 99395 PREV VISIT EST AGE 18-39: CPT | Mod: S$GLB,,, | Performed by: FAMILY MEDICINE

## 2018-10-15 PROCEDURE — 80053 COMPREHEN METABOLIC PANEL: CPT | Mod: PO

## 2018-10-15 PROCEDURE — 99999 PR PBB SHADOW E&M-EST. PATIENT-LVL III: CPT | Mod: PBBFAC,,, | Performed by: FAMILY MEDICINE

## 2018-10-15 PROCEDURE — 36415 COLL VENOUS BLD VENIPUNCTURE: CPT | Mod: PO

## 2018-10-15 PROCEDURE — 80061 LIPID PANEL: CPT

## 2018-10-15 NOTE — ASSESSMENT & PLAN NOTE
Only concern from history uses current smoking habit.  Encouraged him to strongly consider cessation and advised him that if he is still smoking at the next visit in a couple months when he comes in we will have to give him a pneumococcal vaccination.  Getting routine labs today as required for his employee physical

## 2018-10-15 NOTE — PROGRESS NOTES
"Subjective:       Patient ID: Matthew Lazo is a 33 y.o. male.    Chief Complaint: Annual Exam    HPI  Came in today for routine annual exam.  He has new acute complaints at this time and no changes in his health since the last visit.  The only medicine he continues to take is Adderall and has not had any negative side effects with this.  He does continue to smoke and knows that he needs to quit.  He is considering ways to help him quit.  He was using a vaporizer for awhile but this has not helped him really to taper down on the smoking.    Family History   Problem Relation Age of Onset    No Known Problems Mother     Hypertension Father     Alcohol abuse Father     ADD / ADHD Sister     No Known Problems Brother     Cancer Paternal Grandfather     Leukemia Paternal Grandfather        Current Outpatient Medications:     dextroamphetamine-amphetamine (ADDERALL) 20 mg tablet, Take 1 tablet by mouth 2 (two) times daily as needed., Disp: 60 tablet, Rfl: 0    imiquimod (ALDARA) 5 % cream, Use 3x per week at night until resolution. Maximum 16 weeks, Disp: 24 packet, Rfl: 0    Review of Systems   Constitutional: Negative for chills and fever.   Eyes: Negative for visual disturbance.   Respiratory: Negative for cough and shortness of breath.    Cardiovascular: Negative for chest pain.   Gastrointestinal: Negative for abdominal pain.   Neurological: Negative for dizziness.       Objective:   /76 (BP Location: Left arm, Patient Position: Sitting, BP Method: Small (Automatic))   Pulse 86   Temp 97 °F (36.1 °C) (Tympanic)   Resp 18   Ht 5' 11.5" (1.816 m)   Wt 70 kg (154 lb 5.2 oz)   SpO2 100%   BMI 21.22 kg/m²      Physical Exam   Constitutional: He is oriented to person, place, and time. He appears well-developed and well-nourished.   HENT:   Head: Normocephalic and atraumatic.   Eyes: Conjunctivae are normal.   Cardiovascular: Normal rate.   Pulmonary/Chest: Effort normal. No respiratory distress. "   Musculoskeletal: He exhibits no edema.   Neurological: He is alert and oriented to person, place, and time. Coordination normal.   Skin: Skin is warm and dry. No rash noted.   Psychiatric: He has a normal mood and affect. His behavior is normal.   Vitals reviewed.      Assessment & Plan     Problem List Items Addressed This Visit        Other    Routine health maintenance - Primary    Current Assessment & Plan     Only concern from history uses current smoking habit.  Encouraged him to strongly consider cessation and advised him that if he is still smoking at the next visit in a couple months when he comes in we will have to give him a pneumococcal vaccination.  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

## 2018-10-16 LAB
CHOLEST SERPL-MCNC: 208 MG/DL
CHOLEST/HDLC SERPL: 4 {RATIO}
HDLC SERPL-MCNC: 52 MG/DL
HDLC SERPL: 25 %
LDLC SERPL CALC-MCNC: 139.2 MG/DL
NONHDLC SERPL-MCNC: 156 MG/DL
TRIGL SERPL-MCNC: 84 MG/DL

## 2018-10-23 ENCOUNTER — PATIENT MESSAGE (OUTPATIENT)
Dept: INTERNAL MEDICINE | Facility: CLINIC | Age: 33
End: 2018-10-23

## 2018-11-05 ENCOUNTER — PATIENT MESSAGE (OUTPATIENT)
Dept: INTERNAL MEDICINE | Facility: CLINIC | Age: 33
End: 2018-11-05

## 2018-11-06 RX ORDER — DEXTROAMPHETAMINE SACCHARATE, AMPHETAMINE ASPARTATE, DEXTROAMPHETAMINE SULFATE AND AMPHETAMINE SULFATE 5; 5; 5; 5 MG/1; MG/1; MG/1; MG/1
1 TABLET ORAL 2 TIMES DAILY PRN
Qty: 60 TABLET | Refills: 0 | Status: SHIPPED | OUTPATIENT
Start: 2018-11-06 | End: 2018-12-03 | Stop reason: SDUPTHER

## 2018-12-03 RX ORDER — DEXTROAMPHETAMINE SACCHARATE, AMPHETAMINE ASPARTATE, DEXTROAMPHETAMINE SULFATE AND AMPHETAMINE SULFATE 5; 5; 5; 5 MG/1; MG/1; MG/1; MG/1
1 TABLET ORAL 2 TIMES DAILY PRN
Qty: 60 TABLET | Refills: 0 | Status: SHIPPED | OUTPATIENT
Start: 2018-12-03 | End: 2018-12-28 | Stop reason: SDUPTHER

## 2018-12-26 ENCOUNTER — PATIENT MESSAGE (OUTPATIENT)
Dept: INTERNAL MEDICINE | Facility: CLINIC | Age: 33
End: 2018-12-26

## 2018-12-28 ENCOUNTER — OFFICE VISIT (OUTPATIENT)
Dept: INTERNAL MEDICINE | Facility: CLINIC | Age: 33
End: 2018-12-28
Payer: COMMERCIAL

## 2018-12-28 VITALS
HEART RATE: 69 BPM | OXYGEN SATURATION: 100 % | RESPIRATION RATE: 16 BRPM | DIASTOLIC BLOOD PRESSURE: 76 MMHG | HEIGHT: 72 IN | TEMPERATURE: 95 F | WEIGHT: 155.44 LBS | SYSTOLIC BLOOD PRESSURE: 128 MMHG | BODY MASS INDEX: 21.05 KG/M2

## 2018-12-28 DIAGNOSIS — F98.8 ATTENTION DEFICIT DISORDER (ADD) WITHOUT HYPERACTIVITY: Primary | ICD-10-CM

## 2018-12-28 DIAGNOSIS — F17.210 CIGARETTE NICOTINE DEPENDENCE WITHOUT COMPLICATION: ICD-10-CM

## 2018-12-28 PROBLEM — A63.0 GENITAL WARTS DUE TO HPV (HUMAN PAPILLOMAVIRUS): Status: RESOLVED | Noted: 2018-09-13 | Resolved: 2018-12-28

## 2018-12-28 PROBLEM — Z00.00 ROUTINE HEALTH MAINTENANCE: Status: RESOLVED | Noted: 2018-10-15 | Resolved: 2018-12-28

## 2018-12-28 PROCEDURE — 99999 PR PBB SHADOW E&M-EST. PATIENT-LVL III: CPT | Mod: PBBFAC,,, | Performed by: FAMILY MEDICINE

## 2018-12-28 PROCEDURE — 99213 OFFICE O/P EST LOW 20 MIN: CPT | Mod: S$GLB,,, | Performed by: FAMILY MEDICINE

## 2018-12-28 PROCEDURE — 3008F BODY MASS INDEX DOCD: CPT | Mod: CPTII,S$GLB,, | Performed by: FAMILY MEDICINE

## 2018-12-28 RX ORDER — DEXTROAMPHETAMINE SACCHARATE, AMPHETAMINE ASPARTATE, DEXTROAMPHETAMINE SULFATE AND AMPHETAMINE SULFATE 5; 5; 5; 5 MG/1; MG/1; MG/1; MG/1
1 TABLET ORAL 2 TIMES DAILY PRN
Qty: 60 TABLET | Refills: 0 | Status: SHIPPED | OUTPATIENT
Start: 2018-12-28 | End: 2019-01-21 | Stop reason: SDUPTHER

## 2018-12-31 NOTE — PROGRESS NOTES
"Subjective:       Patient ID: Matthew Lazo is a 33 y.o. male.    Chief Complaint: Follow-up (3 month ADD)    HPI   came in today for routine 3 month follow-up for medication monitoring for treatment of ADD with Adderall.  Has been stable on the same dose of medication and doing well.  No negative side effects.  No issues with tachycardia nor weight loss.    Continues to use nicotine but is no longer on cigarettes and has been on the same amount of nicotine with the vaping.  He does plan to institute a tapering system    Family History   Problem Relation Age of Onset    No Known Problems Mother     Hypertension Father     Alcohol abuse Father     ADD / ADHD Sister     No Known Problems Brother     Cancer Paternal Grandfather     Leukemia Paternal Grandfather        Current Outpatient Medications:     dextroamphetamine-amphetamine (ADDERALL) 20 mg tablet, Take 1 tablet by mouth 2 (two) times daily as needed., Disp: 60 tablet, Rfl: 0    Review of Systems   Constitutional: Negative for activity change and unexpected weight change.   HENT: Negative for hearing loss, rhinorrhea and trouble swallowing.    Eyes: Negative for discharge and visual disturbance.   Respiratory: Negative for chest tightness and wheezing.    Cardiovascular: Negative for chest pain and palpitations.   Gastrointestinal: Negative for blood in stool, constipation, diarrhea and vomiting.   Endocrine: Negative for polydipsia and polyuria.   Genitourinary: Negative for difficulty urinating, hematuria and urgency.   Musculoskeletal: Negative for arthralgias, joint swelling and neck pain.   Neurological: Negative for weakness and headaches.   Psychiatric/Behavioral: Negative for confusion and dysphoric mood.       Objective:   /76 (BP Location: Left arm, Patient Position: Sitting, BP Method: Large (Manual))   Pulse 69   Temp (!) 95.1 °F (35.1 °C) (Tympanic)   Resp 16   Ht 5' 11.5" (1.816 m)   Wt 70.5 kg (155 lb 6.8 oz)   SpO2 100%   " BMI 21.38 kg/m²      Physical Exam   Constitutional: He is oriented to person, place, and time. He appears well-developed and well-nourished.   HENT:   Head: Normocephalic and atraumatic.   Eyes: Conjunctivae are normal.   Cardiovascular: Normal rate.   Pulmonary/Chest: Effort normal. No respiratory distress.   Musculoskeletal: He exhibits no edema.   Neurological: He is alert and oriented to person, place, and time. Coordination normal.   Skin: Skin is warm and dry. No rash noted.   Psychiatric: He has a normal mood and affect. His behavior is normal.   Vitals reviewed.      Assessment & Plan     Problem List Items Addressed This Visit        Psychiatric    Attention deficit disorder (ADD) without hyperactivity - Primary    Current Assessment & Plan      Continues to do well on same dose of medication.  Not due for any lab testing at this time but will plan to repeat yearly in October.            Other    Nicotine dependence, uncomplicated    Current Assessment & Plan       Encouraged him to pick some dates where he will shoot for a nicotine target while he is tapering by using vaping                 Follow-up in about 3 months (around 3/28/2019).

## 2018-12-31 NOTE — ASSESSMENT & PLAN NOTE
Continues to do well on same dose of medication.  Not due for any lab testing at this time but will plan to repeat yearly in October.

## 2018-12-31 NOTE — ASSESSMENT & PLAN NOTE
Encouraged him to pick some dates where he will shoot for a nicotine target while he is tapering by using vaping

## 2019-01-21 RX ORDER — DEXTROAMPHETAMINE SACCHARATE, AMPHETAMINE ASPARTATE, DEXTROAMPHETAMINE SULFATE AND AMPHETAMINE SULFATE 5; 5; 5; 5 MG/1; MG/1; MG/1; MG/1
1 TABLET ORAL 2 TIMES DAILY PRN
Qty: 60 TABLET | Refills: 0 | Status: SHIPPED | OUTPATIENT
Start: 2019-01-21 | End: 2019-02-18 | Stop reason: SDUPTHER

## 2019-02-15 RX ORDER — DEXTROAMPHETAMINE SACCHARATE, AMPHETAMINE ASPARTATE, DEXTROAMPHETAMINE SULFATE AND AMPHETAMINE SULFATE 5; 5; 5; 5 MG/1; MG/1; MG/1; MG/1
1 TABLET ORAL 2 TIMES DAILY PRN
Qty: 60 TABLET | Refills: 0 | OUTPATIENT
Start: 2019-02-15 | End: 2019-03-17

## 2019-02-18 ENCOUNTER — PATIENT MESSAGE (OUTPATIENT)
Dept: INTERNAL MEDICINE | Facility: CLINIC | Age: 34
End: 2019-02-18

## 2019-02-18 RX ORDER — DEXTROAMPHETAMINE SACCHARATE, AMPHETAMINE ASPARTATE, DEXTROAMPHETAMINE SULFATE AND AMPHETAMINE SULFATE 5; 5; 5; 5 MG/1; MG/1; MG/1; MG/1
1 TABLET ORAL 2 TIMES DAILY PRN
Qty: 60 TABLET | Refills: 0 | Status: SHIPPED | OUTPATIENT
Start: 2019-02-18 | End: 2019-03-18 | Stop reason: SDUPTHER

## 2019-03-15 RX ORDER — DEXTROAMPHETAMINE SACCHARATE, AMPHETAMINE ASPARTATE, DEXTROAMPHETAMINE SULFATE AND AMPHETAMINE SULFATE 5; 5; 5; 5 MG/1; MG/1; MG/1; MG/1
1 TABLET ORAL 2 TIMES DAILY PRN
Qty: 60 TABLET | Refills: 0 | Status: CANCELLED | OUTPATIENT
Start: 2019-03-15 | End: 2019-04-14

## 2019-03-18 ENCOUNTER — OFFICE VISIT (OUTPATIENT)
Dept: INTERNAL MEDICINE | Facility: CLINIC | Age: 34
End: 2019-03-18
Payer: COMMERCIAL

## 2019-03-18 VITALS
WEIGHT: 151.44 LBS | TEMPERATURE: 96 F | DIASTOLIC BLOOD PRESSURE: 76 MMHG | HEIGHT: 71 IN | SYSTOLIC BLOOD PRESSURE: 132 MMHG | RESPIRATION RATE: 18 BRPM | BODY MASS INDEX: 21.2 KG/M2 | HEART RATE: 85 BPM

## 2019-03-18 DIAGNOSIS — F98.8 ATTENTION DEFICIT DISORDER (ADD) WITHOUT HYPERACTIVITY: Primary | ICD-10-CM

## 2019-03-18 DIAGNOSIS — H00.011 HORDEOLUM EXTERNUM OF RIGHT UPPER EYELID: ICD-10-CM

## 2019-03-18 DIAGNOSIS — F17.210 CIGARETTE NICOTINE DEPENDENCE WITHOUT COMPLICATION: ICD-10-CM

## 2019-03-18 PROCEDURE — 99214 OFFICE O/P EST MOD 30 MIN: CPT | Mod: S$GLB,,, | Performed by: FAMILY MEDICINE

## 2019-03-18 PROCEDURE — 99999 PR PBB SHADOW E&M-EST. PATIENT-LVL III: ICD-10-PCS | Mod: PBBFAC,,, | Performed by: FAMILY MEDICINE

## 2019-03-18 PROCEDURE — 99214 PR OFFICE/OUTPT VISIT, EST, LEVL IV, 30-39 MIN: ICD-10-PCS | Mod: S$GLB,,, | Performed by: FAMILY MEDICINE

## 2019-03-18 PROCEDURE — 3008F PR BODY MASS INDEX (BMI) DOCUMENTED: ICD-10-PCS | Mod: CPTII,S$GLB,, | Performed by: FAMILY MEDICINE

## 2019-03-18 PROCEDURE — 99999 PR PBB SHADOW E&M-EST. PATIENT-LVL III: CPT | Mod: PBBFAC,,, | Performed by: FAMILY MEDICINE

## 2019-03-18 PROCEDURE — 3008F BODY MASS INDEX DOCD: CPT | Mod: CPTII,S$GLB,, | Performed by: FAMILY MEDICINE

## 2019-03-18 RX ORDER — DEXTROAMPHETAMINE SACCHARATE, AMPHETAMINE ASPARTATE, DEXTROAMPHETAMINE SULFATE AND AMPHETAMINE SULFATE 5; 5; 5; 5 MG/1; MG/1; MG/1; MG/1
1 TABLET ORAL 2 TIMES DAILY PRN
Qty: 60 TABLET | Refills: 0 | Status: SHIPPED | OUTPATIENT
Start: 2019-03-18 | End: 2019-03-19

## 2019-03-18 NOTE — PROGRESS NOTES
"Subjective:       Patient ID: Matthew Lazo is a 33 y.o. male.    Chief Complaint: Follow-up    HPI    Here today for routine follow-up and monitoring treatment for ADD.  Has been stable on the same dose of Adderall for awhile now.  No negative side effects to this medication.    Has another new problem that he says he is having.  Eyes on a recurrent basis about 1 every other month.  The warm compresses will help for resolution  But he was just wondering if there is anything to help prevent further recurrences.    In regards to nicotine use, he has made the transition to using nicotine gum some time but does still continue to vape.    Family History   Problem Relation Age of Onset    No Known Problems Mother     Hypertension Father     Alcohol abuse Father     ADD / ADHD Sister     No Known Problems Brother     Cancer Paternal Grandfather     Leukemia Paternal Grandfather        Current Outpatient Medications:     dextroamphetamine-amphetamine (ADDERALL) 20 mg tablet, Take 1 tablet by mouth 2 (two) times daily as needed., Disp: 60 tablet, Rfl: 0    Review of Systems   Constitutional: Negative for chills and fever.   Eyes: Negative for visual disturbance.   Respiratory: Negative for cough and shortness of breath.    Cardiovascular: Negative for chest pain.   Gastrointestinal: Negative for abdominal pain.   Neurological: Negative for dizziness.       Objective:   /76   Pulse 85   Temp 96 °F (35.6 °C) (Tympanic)   Resp 18   Ht 5' 11" (1.803 m)   Wt 68.7 kg (151 lb 7.3 oz)   BMI 21.12 kg/m²      Physical Exam   Constitutional: He is oriented to person, place, and time. He appears well-developed and well-nourished.   HENT:   Head: Normocephalic and atraumatic.   Eyes: Conjunctivae are normal.   Stye on right upper eye lid   Cardiovascular: Normal rate.   Pulmonary/Chest: Effort normal. No respiratory distress.   Musculoskeletal: He exhibits no edema.   Neurological: He is alert and oriented to person, " place, and time. Coordination normal.   Skin: Skin is warm and dry. No rash noted.   Psychiatric: He has a normal mood and affect. His behavior is normal.   Vitals reviewed.      Assessment & Plan     Problem List Items Addressed This Visit        Psychiatric    Attention deficit disorder (ADD) without hyperactivity - Primary    Current Assessment & Plan       Continues to do well on same dose of Adderall.  Sending refill today.            Ophtho    Hordeolum externum of right upper eyelid    Current Assessment & Plan      For prevention of recurrence.  Eyes recommended washing his face at least twice a day with warm water and using soap to rib around his eyelids.            Other    Nicotine dependence, uncomplicated    Current Assessment & Plan       He is planning to pick a quit date possibly for his birthday this year or mother's Day.                 No Follow-up on file.    Disclaimer:  This note may have been prepared using voice recognition software, it may have not been extensively proofed, as such there could be errors within the text such as sound alike errors.

## 2019-03-18 NOTE — TELEPHONE ENCOUNTER
----- Message from Jann Drummond sent at 3/18/2019  3:14 PM CDT -----  Contact: Pt  Type:  RX Refill Request    Who Called: Pt  Refill or New Rx: New Rx  RX Name and Strength: Generic Adderall 60 mg  How is the patient currently taking it? (ex. 1XDay): as needed  Is this a 30 day or 90 day RX:30  Preferred Pharmacy with phone number: [facility] on [address]   Local or Mail Order:local   Ordering Provider: Dr. Menard  Would the patient rather a call back or a response via [facility]? Call back  Best Call Back Number: [phone] (home)   Additional Information: The Pt is calling back to have Rx transferred to [facility] due to the other pharmacy not having enough medication in stock to fill his Rx,please advise

## 2019-03-18 NOTE — ASSESSMENT & PLAN NOTE
For prevention of recurrence.  Eyes recommended washing his face at least twice a day with warm water and using soap to rib around his eyelids.

## 2019-03-19 ENCOUNTER — PATIENT MESSAGE (OUTPATIENT)
Dept: INTERNAL MEDICINE | Facility: CLINIC | Age: 34
End: 2019-03-19

## 2019-03-19 RX ORDER — DEXTROAMPHETAMINE SACCHARATE, AMPHETAMINE ASPARTATE, DEXTROAMPHETAMINE SULFATE AND AMPHETAMINE SULFATE 5; 5; 5; 5 MG/1; MG/1; MG/1; MG/1
1 TABLET ORAL 2 TIMES DAILY PRN
Qty: 60 TABLET | Refills: 0 | Status: SHIPPED | OUTPATIENT
Start: 2019-03-19 | End: 2019-04-15 | Stop reason: SDUPTHER

## 2019-03-19 NOTE — TELEPHONE ENCOUNTER
Previous sent to local [facility] pharmacy in [place]. Nurse spoke with pharmacist medication unable to be filled, supply unavailable. Rx cancelled. Please re send to [facility].

## 2019-04-15 RX ORDER — DEXTROAMPHETAMINE SACCHARATE, AMPHETAMINE ASPARTATE, DEXTROAMPHETAMINE SULFATE AND AMPHETAMINE SULFATE 5; 5; 5; 5 MG/1; MG/1; MG/1; MG/1
1 TABLET ORAL 2 TIMES DAILY PRN
Qty: 60 TABLET | Refills: 0 | Status: SHIPPED | OUTPATIENT
Start: 2019-04-15 | End: 2019-05-16 | Stop reason: SDUPTHER

## 2019-05-06 ENCOUNTER — PATIENT MESSAGE (OUTPATIENT)
Dept: INTERNAL MEDICINE | Facility: CLINIC | Age: 34
End: 2019-05-06

## 2019-05-16 RX ORDER — DEXTROAMPHETAMINE SACCHARATE, AMPHETAMINE ASPARTATE, DEXTROAMPHETAMINE SULFATE AND AMPHETAMINE SULFATE 5; 5; 5; 5 MG/1; MG/1; MG/1; MG/1
1 TABLET ORAL 2 TIMES DAILY PRN
Qty: 60 TABLET | Refills: 0 | Status: SHIPPED | OUTPATIENT
Start: 2019-05-16 | End: 2019-06-13 | Stop reason: SDUPTHER

## 2019-06-12 ENCOUNTER — PATIENT MESSAGE (OUTPATIENT)
Dept: INTERNAL MEDICINE | Facility: CLINIC | Age: 34
End: 2019-06-12

## 2019-06-13 ENCOUNTER — OFFICE VISIT (OUTPATIENT)
Dept: INTERNAL MEDICINE | Facility: CLINIC | Age: 34
End: 2019-06-13
Payer: COMMERCIAL

## 2019-06-13 VITALS
BODY MASS INDEX: 21.82 KG/M2 | WEIGHT: 155.88 LBS | DIASTOLIC BLOOD PRESSURE: 77 MMHG | SYSTOLIC BLOOD PRESSURE: 125 MMHG | RESPIRATION RATE: 18 BRPM | HEIGHT: 71 IN | TEMPERATURE: 97 F | HEART RATE: 79 BPM

## 2019-06-13 DIAGNOSIS — F98.8 ATTENTION DEFICIT DISORDER (ADD) WITHOUT HYPERACTIVITY: Primary | ICD-10-CM

## 2019-06-13 PROCEDURE — 99999 PR PBB SHADOW E&M-EST. PATIENT-LVL III: CPT | Mod: PBBFAC,,, | Performed by: FAMILY MEDICINE

## 2019-06-13 PROCEDURE — 3008F BODY MASS INDEX DOCD: CPT | Mod: CPTII,S$GLB,, | Performed by: FAMILY MEDICINE

## 2019-06-13 PROCEDURE — 99999 PR PBB SHADOW E&M-EST. PATIENT-LVL III: ICD-10-PCS | Mod: PBBFAC,,, | Performed by: FAMILY MEDICINE

## 2019-06-13 PROCEDURE — 99213 OFFICE O/P EST LOW 20 MIN: CPT | Mod: S$GLB,,, | Performed by: FAMILY MEDICINE

## 2019-06-13 PROCEDURE — 99213 PR OFFICE/OUTPT VISIT, EST, LEVL III, 20-29 MIN: ICD-10-PCS | Mod: S$GLB,,, | Performed by: FAMILY MEDICINE

## 2019-06-13 PROCEDURE — 3008F PR BODY MASS INDEX (BMI) DOCUMENTED: ICD-10-PCS | Mod: CPTII,S$GLB,, | Performed by: FAMILY MEDICINE

## 2019-06-13 RX ORDER — DEXTROAMPHETAMINE SACCHARATE, AMPHETAMINE ASPARTATE, DEXTROAMPHETAMINE SULFATE AND AMPHETAMINE SULFATE 5; 5; 5; 5 MG/1; MG/1; MG/1; MG/1
1 TABLET ORAL 2 TIMES DAILY PRN
Qty: 60 TABLET | Refills: 0 | Status: SHIPPED | OUTPATIENT
Start: 2019-06-13 | End: 2019-07-11 | Stop reason: SDUPTHER

## 2019-06-13 NOTE — ASSESSMENT & PLAN NOTE
Has been doing well on same dose of medication.  Follow-up in 3 months.  At that time we may do a telemedicine visit if possible   DVT ppx- on a/c with coumadin for atrial thrombus

## 2019-06-13 NOTE — PROGRESS NOTES
"Subjective:       Patient ID: Matthew Lazo is a 34 y.o. male.    Chief Complaint: Follow-up (adhd med)    HPI   here today for routine follow-up on ADD treatment.  He has been taking the same dose of Adderall without any problems.  No tachycardia palpitations or weight loss.  He is excited about potential job opportunity with [employer]    Family History   Problem Relation Age of Onset    No Known Problems Mother     Hypertension Father     Alcohol abuse Father     ADD / ADHD Sister     No Known Problems Brother     Cancer Paternal Grandfather     Leukemia Paternal Grandfather        Current Outpatient Medications:     dextroamphetamine-amphetamine (ADDERALL) 20 mg tablet, Take 1 tablet by mouth 2 (two) times daily as needed., Disp: 60 tablet, Rfl: 0    Review of Systems   Constitutional: Negative for activity change and unexpected weight change.   HENT: Negative for hearing loss, rhinorrhea and trouble swallowing.    Eyes: Negative for discharge and visual disturbance.   Respiratory: Negative for chest tightness and wheezing.    Cardiovascular: Negative for chest pain and palpitations.   Gastrointestinal: Negative for blood in stool, constipation, diarrhea and vomiting.   Endocrine: Negative for polydipsia and polyuria.   Genitourinary: Negative for difficulty urinating, hematuria and urgency.   Musculoskeletal: Negative for arthralgias, joint swelling and neck pain.   Neurological: Negative for weakness and headaches.   Psychiatric/Behavioral: Negative for confusion and dysphoric mood.       Objective:   /77   Pulse 79   Temp 96.5 °F (35.8 °C) (Tympanic)   Resp 18   Ht 5' 11" (1.803 m)   Wt 70.7 kg (155 lb 13.8 oz)   BMI 21.74 kg/m²      Physical Exam   Constitutional: He is oriented to person, place, and time. He appears well-developed and well-nourished.   HENT:   Head: Normocephalic and atraumatic.   Eyes: Conjunctivae are normal.   Cardiovascular: Normal rate.   Pulmonary/Chest: Effort normal. " No respiratory distress.   Musculoskeletal: He exhibits no edema.   Neurological: He is alert and oriented to person, place, and time. Coordination normal.   Skin: Skin is warm and dry. No rash noted.   Psychiatric: He has a normal mood and affect. His behavior is normal.   Vitals reviewed.      Assessment & Plan     Problem List Items Addressed This Visit        Psychiatric    Attention deficit disorder (ADD) without hyperactivity - Primary    Current Assessment & Plan      Has been doing well on same dose of medication.  Follow-up in 3 months.  At that time we may do a telemedicine visit if possible                 Follow up in about 3 months (around 9/13/2019).    Disclaimer:  This note may have been prepared using voice recognition software, it may have not been extensively proofed, as such there could be errors within the text such as sound alike errors.

## 2019-07-11 ENCOUNTER — PATIENT MESSAGE (OUTPATIENT)
Dept: INTERNAL MEDICINE | Facility: CLINIC | Age: 34
End: 2019-07-11

## 2019-07-11 RX ORDER — DEXTROAMPHETAMINE SACCHARATE, AMPHETAMINE ASPARTATE, DEXTROAMPHETAMINE SULFATE AND AMPHETAMINE SULFATE 5; 5; 5; 5 MG/1; MG/1; MG/1; MG/1
1 TABLET ORAL 2 TIMES DAILY PRN
Qty: 60 TABLET | Refills: 0 | OUTPATIENT
Start: 2019-07-11 | End: 2019-08-10

## 2019-07-11 RX ORDER — DEXTROAMPHETAMINE SACCHARATE, AMPHETAMINE ASPARTATE, DEXTROAMPHETAMINE SULFATE AND AMPHETAMINE SULFATE 5; 5; 5; 5 MG/1; MG/1; MG/1; MG/1
1 TABLET ORAL 2 TIMES DAILY PRN
Qty: 60 TABLET | Refills: 0 | Status: SHIPPED | OUTPATIENT
Start: 2019-07-11 | End: 2019-08-07 | Stop reason: SDUPTHER

## 2019-08-07 RX ORDER — DEXTROAMPHETAMINE SACCHARATE, AMPHETAMINE ASPARTATE, DEXTROAMPHETAMINE SULFATE AND AMPHETAMINE SULFATE 5; 5; 5; 5 MG/1; MG/1; MG/1; MG/1
1 TABLET ORAL 2 TIMES DAILY PRN
Qty: 60 TABLET | Refills: 0 | Status: SHIPPED | OUTPATIENT
Start: 2019-08-07 | End: 2019-09-06 | Stop reason: SDUPTHER

## 2019-08-07 NOTE — TELEPHONE ENCOUNTER
Discharge Summary   Patient ID:   Abdi Tinsley Jr.   [id number]   75 year old   1942   Admit date: 3/24/2018   Discharge date: 3/26/2018   Admitting Physician: Maddison Fang MD   Discharge Physician: Maxx Woody MD     Primary Diagnoses:   Principal Problem:    CVA (cerebral vascular accident) (CMS/[facility])  Active Problems:    HTN (hypertension)    Renal insufficiency    Weakness of right lower extremity  Resolved Problems:    * No resolved hospital problems. *     Secondary Diagnoses:   Past Medical History:   Diagnosis Date   • Essential (primary) hypertension    • Kidney stone    • Other and unspecified hyperlipidemia    • Personal history of traumatic fracture     fx leg    • Urinary tract infection         Hospital Course By Problem List (see H&P for details of admission):     · Right LE weakness- improving, most likely from acute CVA, MRI brain is normal but the only plausible explanation is that the patient may have had a small ischemic event that could have been missed on the MRI of the brain causing his symptoms. D/w Dr Calix  - ASA, plavix and atorvastatin.  - Lifestyle modification and exercises were discussed with the patient.  - Out-pt PT and OT recommended on discharge.     · Permissive hypertension- HELD all BP meds initially per Stroke protocol. c/w hydration. Resume BP meds in am     · Acute kidney injury on chronic kidney disease stage 3  -Resolved SIMRAN. Creatinine is back to baseline.      · Dyslipidemia- high dose statins      · DVT Prophylaxis-SCDs, TEDs and sq heparin     · Disposition- DC home with out-pt PT and OT. Fu Dr Calix in 2 weeks      Consults   IP Consult Orders     Start     Ordered    03/24/18 1658  Inpatient consult to Neurology  ONE TIME     Provider:  Berlin Calix MD    03/24/18 1657           Procedures performed Mri Brain    Result Date: 3/24/2018  MRI OF THE BRAIN WITHOUT CONTRAST. INDICATION: Left leg weakness of acute onset. TECHNIQUE: A multiplanar  Last visit 06/13/19   multi-weighted examination of the brain and brainstem was performed, without administration of contrast. T1 sagittal and axial, T2 axial KATHE, Axial GRE and Flair sequences were obtained as well as diffusion imaging. COMPARISON: None available. FINDINGS:  The visualized brain parenchyma demonstrates normal appearances with maintenance of the gray-white matter differentiation. No intra or extra-axial collection or mass lesion is demonstrated. There is no focal edema or mass effect. There is no midline shift. There is no evidence of restricted diffusion to suggest the presence of acute ischemia. The ventricular system is normal in size and configuration. The basal cisterns are patent. The major vascular flow-voids are maintained. Limited evaluation of the visualized parts of the paranasal sinuses, orbital contents, bilateral mastoid air cells, and bony calvarium is unremarkable.     IMPRESSION: Negative MRI examination of the brain. The findings of this examination were discussed with the referring physician   by telephone at 1650hrs., shortly after the examination was concluded     Mri Thoracic Spine    Result Date: 3/24/2018  EXAM:MRI  THORACIC SPINE W WO  CONTRAST INDICATION: Abnormal gait. Right leg weakness. COMPARISON: None TECHNIQUE: Multiplanar multiecho MRI thoracic spine without and with intravenous gadolinium. FINDINGS: Thoracic spine alignment is normal. There is no fracture or bone marrow edema. Mild generalized disc desiccation in the thoracic spine. Mild disc bulge at T11-T12. The thoracic cord demonstrates normal morphology and signal intensity. No spinal stenosis. No neural foraminal narrowing. Partially visualized T2 hypointense lesion in the posterior left kidney measuring approximately 1 cm. This is incompletely characterized however corresponds to a hemorrhagic cyst seen on a previous abdominal CT. No abnormal enhancement after gadolinium administration.     IMPRESSION: 1. Mild degenerative changes  in the thoracic spine. No neural foraminal narrowing, spinal stenosis, fracture or cord signal abnormality. 2. Incompletely characterized presumed hemorrhagic cyst in the left kidney.     Mri Lumbar Spine    Result Date: 3/24/2018  EXAM:MRI LUMBAR SPINE W WO  CONTRAST INDICATION: Low back pain. Right leg weakness. COMPARISON: None TECHNIQUE: Multiplanar multiecho MRI lumbar spine performed without and with intravenous gadolinium. FINDINGS: Conus terminates normally at L1. Generalized disc desiccation and disc space narrowing in the lumbar spine. Mild degenerative endplate changes including mild endplate edema at L3-4 posteriorly. No fracture. L1-2: Disc desiccation and narrowing with a right paracentral disc extrusion extending proximally at the posterior aspect of the L1 vertebral body best seen on series 6 image 12 and series 9 image 7. Mild facet arthropathy. Shallow disc bulge. No significant neuroforaminal narrowing or spinal stenosis. L2-3: Facet arthropathy and ligamentum flavum hypertrophy. Broad-based disc bulge with right lateral recess disc extrusion measuring approximately 10 mm best seen on series 6 image 13 and series 9 image 15 which impinges on the traversing right L3 nerve root just as before it enters the foramen. No significant neuroforaminal narrowing. Mild spinal stenosis. L3-4: Facet arthropathy and ligamentum flavum hypertrophy. Broad-based disc bulge. Mild spinal stenosis and lateral recess narrowing. Mild bilateral neuroforaminal narrowing. L4-5: Severe facet arthritis and ligamentum flavum hypertrophy. Broad-based disc bulge. Small degenerative intraosseous cyst in the posterior right superior endplate of L5. Moderate spinal stenosis and bilateral lateral recess narrowing. Mild-moderate left foraminal stenosis. L5-S1: Broad-based disc bulge with small left foraminal protrusion. Facet arthropathy. Mild right neuroforaminal narrowing. Moderate left neuroforaminal narrowing. No significant  spinal stenosis. No abnormal enhancement after gadolinium. The previously seen presumed hemorrhagic cyst in the left kidney is again identified measuring approximately 12 mm.     IMPRESSION: 1. Moderate degenerative changes lumbar spine without acute bony abnormality. 2. Right lateral recess disc extrusion at L2-3 impinges the traversing right L3 nerve root just before entering the neural foramen. 3. Moderate spinal stenosis and bilateral lateral recess narrowing at L4-5.     Ct Angio Head And Neck Level 1    Result Date: 3/24/2018  EXAM: CT ANGIOGRAM OF THE CAROTID ARTERIES HISTORY: Possible CVA and question of carotid stenosis COMPARISON: None TECHNIQUE:  A noncontrast dataset is obtained.  Subsequently, volumetric data acquisition is obtained during the arterial phase of enhancement, following the administration of 150 cc of 300 Isovue contrast. MIP images as well as coronal and sagittal reformations were performed. Additional fly-through pre-formations were performed on the workstation. FINDINGS: The left common carotid arises from the aorta in the conventional fashion. There is no evidence of focal stenosis or thrombosis. The contrast contour is entirely smooth. The carotid bulb is entirely normal with no evidence of atheromatous disease or calcification. There is no evidence of stenosis. The internal carotid is entirely normal with no evidence of focal stenosis or thrombosis. There is no evidence of atheromatous plaque formation. The right common carotid arises from the innominate artery in the usual manner, and indeed this vessel arises from the aorta in the usual fashion. There is no evidence of focal stenosis in either vessel nor evidence of atheromatous plaque formation. The carotid bulb is normal with no evidence of atheromatous plaque formation or focal stenosis. The internal carotid is entirely normal in addition without evidence of stenosis or plaque formation. The vertebral arteries arise in the  normal fashion bilaterally and are remarkably similar in caliber. There is no evidence of focal stenosis or thrombosis. The vertebral arteries form the basilar artery in the usual fashion which is of normal caliber. No obvious abnormality of the soft tissues of the neck is demonstrated. There is no obvious soft tissue asymmetry. No significant lymphadenopathy is demonstrated. There are minor cervical spondylotic changes present given the age of the patient The lung apices appear clear. The NASCET criteria were used for estimation of any degree of stenosis that was demonstrated.     IMPRESSION: No focal carotid abnormality of significance. There is no focal stenosis or atheromatous plaque formation. EXAM: CT Angio head INDICATION: Possible CVA TECHNIQUE: Volumetric acquisition was followed by thin slice axial sagittal and coronal reformation, following intravenous contrast administration. MIP images were obtained as well as coronal and sagittal reformations COMPARISON: None. FINDINGS: There is good delineation of the carotids as they pass through the base of the skull. They divide in a normal fashion into a generous caliber middle cerebral artery as well as anterior cerebral artery bilaterally. The M1 and M2 segments as well as the A1 and A2 segments are well visualized and appear to be of normal caliber. No focal stenosis or occlusion is demonstrated. There is no evidence of a vascular abnormality of significance in particular no evidence of an arteriovenous fistula or malformation The basilar artery divides into the right and left posterior cerebral arteries in a normal fashion. No vascular abnormality of significance is demonstrated in particular no evidence of focal stenosis, a fistula or malformation. IMPRESSION: Normal intracranial circulation with no evidence of focal stenosis, an arteriovenous malformation or arteriovenous fistula The findings of this examination were discussed with the referring physician    by telephone at 1620hrs., shortly after the examination was concluded     Ct Head Level 1    Result Date: 3/24/2018  CT HEAD WITHOUT CONTRAST INDICATION: Question of evolving stroke. Right leg weakness since awoke this morning. COMPARISON:  None TECHNIQUE:  Multiple axial images and a lateral scanogram were obtained through the head without the administration of IV contrast.  The examination  was viewed in bone and soft tissue windows. FINDINGS:  The brain parenchymal volume is age appropriate and the gray white matter differentiation is preserved.  There is no evidence of intracranial hemorrhage or an extra-axial fluid collection demonstrated. No mass nor mass effect is evident. No findings to suggest acute infarction are apparent. The ventricular system is midline without evidence of hydrocephalus or intraventricular hemorrhage.  The basal ganglia and deep gray matter structures are unremarkable.  Allowing for beam hardening artifact, the posterior fossa structures are intact and the basilar cisterns are patent. The paranasal sinuses and mastoid air cells demonstrate normal aeration and development.      IMPRESSION:  Normal CT scan of the head performed without contrast. The findings of this examination were discussed with the referring physician by telephone, shortly after the examination was concluded at 1510 hours         Discharge Exam   Blood pressure 133/78, pulse 64, temperature 97.8 °F (36.6 °C), temperature source Tympanic, resp. rate 16, height 5' 10.5\" (1.791 m), weight 100.4 kg, SpO2 97 %.   General: A&O x3, in NAD   Lungs: CTA B/L   Heart: S1 S2 RRR  Abdomen: soft, NT ND +BS  Neurology: He has improved strength in the right lower extremity. It is now approximately 4+ out of 5 weakness slightly stronger distally than proximally. In proximal muscles in the hip flexors as it's weaker. Otherwise, strength was 5 out of 5 bilaterally in the upper and lower extremities proximally and distally    Activity:  regular out-patient PT & OT; 150 mins cardiovascular exercise weekly.    Diet: 2 gm Na heart healthy diet, cardiac low fat    Code Status: Full Resuscitation     Pending issues to be followed up by PCP   Please confirm correct medication reconciliation after discharge from hospital stay.       New Medications Started During Hospitalization: ASA, Plavix and Atorvastatin    Medications Discontinued During Hospitalization: DC home zocor     Discharge Medication List:       Summary of your Discharge Medications      Take these Medications      Details   aspirin 81 MG tablet   Take 81 mg by mouth daily.     atorvastatin 80 MG tablet  Commonly known as:  LIPITOR   Take 1 tablet by mouth nightly.     CALCIUM 1200 PO   Take 1 capsule by mouth daily.     clopidogrel 75 MG tablet  Commonly known as:  PLAVIX  Start taking on:  3/27/2018   Take 1 tablet by mouth daily.     losartan 100 MG tablet  Commonly known as:  COZAAR   Take 1 tablet by mouth daily.  Comment:  Please note increase in dosage.     METAMUCIL PO   One tablespoon po daily.     MULTIVITAMIN PO   Take 1 capsule by mouth daily.     Silodosin 8 MG Cap  Commonly known as:  RAPAFLO   Take 1 capsule by mouth daily.     simvastatin 20 MG tablet  Commonly known as:  ZOCOR   Take 1 tablet by mouth nightly.             Disposition: patient is being discharged to home with out-pt PT and OT    Follow-up with:   Ye Diaz DO  [address]  [place] [id number] [phone]    In 1 week  Post Hospitalization follow up visit    Berlin Calix MD  [address]  [address] [address] [phone]    In 2 weeks  Post Hospitalization follow up visit       Time spent on discharge & coordination of care was greater than 35 minutes   Signed:   Maxx Woody MD   3/26/2018   2:28 PM

## 2019-09-03 ENCOUNTER — PATIENT MESSAGE (OUTPATIENT)
Dept: INTERNAL MEDICINE | Facility: CLINIC | Age: 34
End: 2019-09-03

## 2019-09-06 ENCOUNTER — OFFICE VISIT (OUTPATIENT)
Dept: INTERNAL MEDICINE | Facility: CLINIC | Age: 34
End: 2019-09-06
Payer: COMMERCIAL

## 2019-09-06 VITALS
TEMPERATURE: 98 F | HEART RATE: 81 BPM | BODY MASS INDEX: 21.39 KG/M2 | DIASTOLIC BLOOD PRESSURE: 69 MMHG | SYSTOLIC BLOOD PRESSURE: 121 MMHG | HEIGHT: 71 IN | WEIGHT: 152.75 LBS | OXYGEN SATURATION: 100 %

## 2019-09-06 DIAGNOSIS — F98.8 ATTENTION DEFICIT DISORDER (ADD) WITHOUT HYPERACTIVITY: Primary | ICD-10-CM

## 2019-09-06 PROBLEM — H00.011 HORDEOLUM EXTERNUM OF RIGHT UPPER EYELID: Status: RESOLVED | Noted: 2019-03-18 | Resolved: 2019-09-06

## 2019-09-06 PROCEDURE — 3008F BODY MASS INDEX DOCD: CPT | Mod: CPTII,S$GLB,, | Performed by: FAMILY MEDICINE

## 2019-09-06 PROCEDURE — 99999 PR PBB SHADOW E&M-EST. PATIENT-LVL III: CPT | Mod: PBBFAC,,, | Performed by: FAMILY MEDICINE

## 2019-09-06 PROCEDURE — 99213 OFFICE O/P EST LOW 20 MIN: CPT | Mod: S$GLB,,, | Performed by: FAMILY MEDICINE

## 2019-09-06 PROCEDURE — 99999 PR PBB SHADOW E&M-EST. PATIENT-LVL III: ICD-10-PCS | Mod: PBBFAC,,, | Performed by: FAMILY MEDICINE

## 2019-09-06 PROCEDURE — 99213 PR OFFICE/OUTPT VISIT, EST, LEVL III, 20-29 MIN: ICD-10-PCS | Mod: S$GLB,,, | Performed by: FAMILY MEDICINE

## 2019-09-06 PROCEDURE — 3008F PR BODY MASS INDEX (BMI) DOCUMENTED: ICD-10-PCS | Mod: CPTII,S$GLB,, | Performed by: FAMILY MEDICINE

## 2019-09-06 RX ORDER — DEXTROAMPHETAMINE SACCHARATE, AMPHETAMINE ASPARTATE, DEXTROAMPHETAMINE SULFATE AND AMPHETAMINE SULFATE 5; 5; 5; 5 MG/1; MG/1; MG/1; MG/1
1 TABLET ORAL 2 TIMES DAILY PRN
Qty: 60 TABLET | Refills: 0 | Status: SHIPPED | OUTPATIENT
Start: 2019-09-06 | End: 2019-10-03 | Stop reason: SDUPTHER

## 2019-09-06 NOTE — PROGRESS NOTES
"Subjective:       Patient ID: Matthew Lazo is a 34 y.o. male.    Chief Complaint: 3mth check up on Rx (Adderall 20 mg)    HPI Patient with ADHD here for follow-up. Doing well on current dose of Adderall. Denies appetite changes, weight loss, sleep disturbances, palpitations. Enjoys current work at [employer]. Holding off on [employer] position for now.     Review of Systems   Constitutional: Negative for activity change and unexpected weight change.   HENT: Negative for hearing loss, rhinorrhea and trouble swallowing.    Eyes: Negative for discharge and visual disturbance.   Respiratory: Negative for chest tightness and wheezing.    Cardiovascular: Negative for chest pain and palpitations.   Gastrointestinal: Negative for blood in stool, constipation, diarrhea and vomiting.   Endocrine: Negative for polydipsia and polyuria.   Genitourinary: Negative for difficulty urinating, hematuria and urgency.   Musculoskeletal: Negative for arthralgias, joint swelling and neck pain.   Neurological: Negative for weakness and headaches.   Psychiatric/Behavioral: Negative for confusion and dysphoric mood.         Current Outpatient Medications   Medication Sig Dispense Refill    dextroamphetamine-amphetamine (ADDERALL) 20 mg tablet Take 1 tablet by mouth 2 (two) times daily as needed. 60 tablet 0     No current facility-administered medications for this visit.        Objective:     Vitals:    09/06/19 1017   BP: 121/69   BP Location: Left arm   Patient Position: Sitting   BP Method: Medium (Automatic)   Pulse: 81   Temp: 98.4 °F (36.9 °C)   TempSrc: Oral   SpO2: 100%   Weight: 69.3 kg (152 lb 12.5 oz)   Height: 5' 11" (1.803 m)          Physical Exam   Constitutional: He is oriented to person, place, and time. He appears well-developed and well-nourished.   HENT:   Head: Normocephalic and atraumatic.   Cardiovascular: Normal rate, regular rhythm and normal heart sounds.   Pulmonary/Chest: Effort normal and breath sounds " normal.   Neurological: He is alert and oriented to person, place, and time.   Psychiatric: He has a normal mood and affect. His behavior is normal. Judgment and thought content normal.       Assessment:       1. Attention deficit disorder (ADD) without hyperactivity        Plan:       Attention deficit disorder (ADD) without hyperactivity  Comments:  -Doing well on Adderall. Will continue current dose of medication  -F/U 3 months  Orders:  -     dextroamphetamine-amphetamine (ADDERALL) 20 mg tablet; Take 1 tablet by mouth 2 (two) times daily as needed.  Dispense: 60 tablet; Refill: 0        Rand Alfaro MD  9/6/2019

## 2019-09-06 NOTE — PROGRESS NOTES
Subjective:       Patient ID: Matthew Lazo is a 34 y.o. male.    Chief Complaint: No chief complaint on file.    HPI  Review of Systems   Constitutional: Negative for activity change and unexpected weight change.   HENT: Negative for hearing loss, rhinorrhea and trouble swallowing.    Eyes: Negative for discharge and visual disturbance.   Respiratory: Negative for chest tightness and wheezing.    Cardiovascular: Negative for chest pain and palpitations.   Gastrointestinal: Negative for blood in stool, constipation, diarrhea and vomiting.   Endocrine: Negative for polydipsia and polyuria.   Genitourinary: Negative for difficulty urinating, hematuria and urgency.   Musculoskeletal: Negative for arthralgias, joint swelling and neck pain.   Neurological: Negative for weakness and headaches.   Psychiatric/Behavioral: Negative for confusion and dysphoric mood.       Objective:     There were no vitals filed for this visit.  Physical Exam    Assessment:       No diagnosis found.    Plan:       ***

## 2019-10-03 DIAGNOSIS — F98.8 ATTENTION DEFICIT DISORDER (ADD) WITHOUT HYPERACTIVITY: ICD-10-CM

## 2019-10-03 RX ORDER — DEXTROAMPHETAMINE SACCHARATE, AMPHETAMINE ASPARTATE, DEXTROAMPHETAMINE SULFATE AND AMPHETAMINE SULFATE 5; 5; 5; 5 MG/1; MG/1; MG/1; MG/1
1 TABLET ORAL 2 TIMES DAILY PRN
Qty: 60 TABLET | Refills: 0 | Status: SHIPPED | OUTPATIENT
Start: 2019-10-03 | End: 2019-11-01 | Stop reason: SDUPTHER

## 2019-10-25 ENCOUNTER — PATIENT MESSAGE (OUTPATIENT)
Dept: INTERNAL MEDICINE | Facility: CLINIC | Age: 34
End: 2019-10-25

## 2019-10-28 ENCOUNTER — LAB VISIT (OUTPATIENT)
Dept: LAB | Facility: HOSPITAL | Age: 34
End: 2019-10-28
Payer: COMMERCIAL

## 2019-10-28 ENCOUNTER — OFFICE VISIT (OUTPATIENT)
Dept: INTERNAL MEDICINE | Facility: CLINIC | Age: 34
End: 2019-10-28
Payer: COMMERCIAL

## 2019-10-28 VITALS
DIASTOLIC BLOOD PRESSURE: 72 MMHG | RESPIRATION RATE: 19 BRPM | OXYGEN SATURATION: 99 % | SYSTOLIC BLOOD PRESSURE: 127 MMHG | WEIGHT: 154.75 LBS | HEIGHT: 71 IN | TEMPERATURE: 99 F | HEART RATE: 92 BPM | BODY MASS INDEX: 21.66 KG/M2

## 2019-10-28 DIAGNOSIS — Z00.00 WELLNESS EXAMINATION: ICD-10-CM

## 2019-10-28 DIAGNOSIS — Z00.00 WELLNESS EXAMINATION: Primary | ICD-10-CM

## 2019-10-28 LAB
ALBUMIN SERPL BCP-MCNC: 4.4 G/DL (ref 3.5–5.2)
ALP SERPL-CCNC: 95 U/L (ref 55–135)
ALT SERPL W/O P-5'-P-CCNC: 30 U/L (ref 10–44)
ANION GAP SERPL CALC-SCNC: 7 MMOL/L (ref 8–16)
AST SERPL-CCNC: 20 U/L (ref 10–40)
BASOPHILS # BLD AUTO: 0.03 K/UL (ref 0–0.2)
BASOPHILS NFR BLD: 0.3 % (ref 0–1.9)
BILIRUB SERPL-MCNC: 0.4 MG/DL (ref 0.1–1)
BUN SERPL-MCNC: 13 MG/DL (ref 6–20)
CALCIUM SERPL-MCNC: 8.9 MG/DL (ref 8.7–10.5)
CHLORIDE SERPL-SCNC: 103 MMOL/L (ref 95–110)
CO2 SERPL-SCNC: 27 MMOL/L (ref 23–29)
CREAT SERPL-MCNC: 1 MG/DL (ref 0.5–1.4)
DIFFERENTIAL METHOD: NORMAL
EOSINOPHIL # BLD AUTO: 0.1 K/UL (ref 0–0.5)
EOSINOPHIL NFR BLD: 1.5 % (ref 0–8)
ERYTHROCYTE [DISTWIDTH] IN BLOOD BY AUTOMATED COUNT: 13.2 % (ref 11.5–14.5)
EST. GFR  (AFRICAN AMERICAN): >60 ML/MIN/1.73 M^2
EST. GFR  (NON AFRICAN AMERICAN): >60 ML/MIN/1.73 M^2
GIANT PLATELETS BLD QL SMEAR: PRESENT
GLUCOSE SERPL-MCNC: 84 MG/DL (ref 70–110)
HCT VFR BLD AUTO: 49.4 % (ref 40–54)
HGB BLD-MCNC: 16.5 G/DL (ref 14–18)
IMM GRANULOCYTES # BLD AUTO: 0.02 K/UL (ref 0–0.04)
IMM GRANULOCYTES NFR BLD AUTO: 0.2 % (ref 0–0.5)
LYMPHOCYTES # BLD AUTO: 2.3 K/UL (ref 1–4.8)
LYMPHOCYTES NFR BLD: 24.9 % (ref 18–48)
MCH RBC QN AUTO: 31 PG (ref 27–31)
MCHC RBC AUTO-ENTMCNC: 33.4 G/DL (ref 32–36)
MCV RBC AUTO: 93 FL (ref 82–98)
MONOCYTES # BLD AUTO: 0.6 K/UL (ref 0.3–1)
MONOCYTES NFR BLD: 6.2 % (ref 4–15)
NEUTROPHILS # BLD AUTO: 6.1 K/UL (ref 1.8–7.7)
NEUTROPHILS NFR BLD: 66.9 % (ref 38–73)
NRBC BLD-RTO: 0 /100 WBC
PLATELET # BLD AUTO: 259 K/UL (ref 150–350)
PLATELET BLD QL SMEAR: NORMAL
PMV BLD AUTO: 11.2 FL (ref 9.2–12.9)
POTASSIUM SERPL-SCNC: 4.3 MMOL/L (ref 3.5–5.1)
PROT SERPL-MCNC: 7.8 G/DL (ref 6–8.4)
RBC # BLD AUTO: 5.33 M/UL (ref 4.6–6.2)
SODIUM SERPL-SCNC: 137 MMOL/L (ref 136–145)
WBC # BLD AUTO: 9.07 K/UL (ref 3.9–12.7)

## 2019-10-28 PROCEDURE — 99999 PR PBB SHADOW E&M-EST. PATIENT-LVL III: CPT | Mod: PBBFAC,,, | Performed by: FAMILY MEDICINE

## 2019-10-28 PROCEDURE — 80061 LIPID PANEL: CPT

## 2019-10-28 PROCEDURE — 99395 PR PREVENTIVE VISIT,EST,18-39: ICD-10-PCS | Mod: S$GLB,,, | Performed by: FAMILY MEDICINE

## 2019-10-28 PROCEDURE — 85025 COMPLETE CBC W/AUTO DIFF WBC: CPT | Mod: PO

## 2019-10-28 PROCEDURE — 99999 PR PBB SHADOW E&M-EST. PATIENT-LVL III: ICD-10-PCS | Mod: PBBFAC,,, | Performed by: FAMILY MEDICINE

## 2019-10-28 PROCEDURE — 83036 HEMOGLOBIN GLYCOSYLATED A1C: CPT

## 2019-10-28 PROCEDURE — 36415 COLL VENOUS BLD VENIPUNCTURE: CPT | Mod: PO

## 2019-10-28 PROCEDURE — 99395 PREV VISIT EST AGE 18-39: CPT | Mod: S$GLB,,, | Performed by: FAMILY MEDICINE

## 2019-10-28 PROCEDURE — 80053 COMPREHEN METABOLIC PANEL: CPT | Mod: PO

## 2019-10-28 NOTE — PROGRESS NOTES
" Patient ID: Matthew Lazo is a 34 y.o. male.    Chief Complaint: Annual Exam    HPI Pt is 33 yo M with ADHD here for work physical.  Has no acute complaints today. Is on no meds other than Adderall for ADHD. He works at [employer].     Family History   Problem Relation Age of Onset    No Known Problems Mother     Hypertension Father     Alcohol abuse Father     ADD / ADHD Sister     No Known Problems Brother     Cancer Paternal Grandfather     Leukemia Paternal Grandfather        Current Outpatient Medications:     dextroamphetamine-amphetamine (ADDERALL) 20 mg tablet, Take 1 tablet by mouth 2 (two) times daily as needed., Disp: 60 tablet, Rfl: 0    Review of Systems   Constitutional: Negative for chills and fever.   HENT: Negative for congestion and sore throat.    Eyes: Negative for visual disturbance.   Respiratory: Negative for cough and shortness of breath.    Cardiovascular: Negative for chest pain.   Gastrointestinal: Negative for abdominal pain, diarrhea and nausea.   Endocrine: Negative for polydipsia and polyuria.   Genitourinary: Negative for dysuria.   Musculoskeletal: Negative for arthralgias.   Skin: Negative for rash.   Neurological: Negative for dizziness and headaches.   Psychiatric/Behavioral: Negative for sleep disturbance. The patient is not nervous/anxious.        Objective:   /72 (BP Location: Left arm, Patient Position: Sitting, BP Method: Medium (Automatic))   Pulse 92   Temp 98.5 °F (36.9 °C) (Tympanic)   Resp 19   Ht 5' 11" (1.803 m)   Wt 70.2 kg (154 lb 12.2 oz)   SpO2 99%   BMI 21.59 kg/m²      Physical Exam   Constitutional: He is oriented to person, place, and time. He appears well-developed and well-nourished. No distress.   HENT:   Head: Normocephalic and atraumatic.   Nose: Nose normal.   Mouth/Throat: Oropharynx is clear and moist.   Eyes: Conjunctivae are normal. Right eye exhibits no discharge.   Neck: Normal range of motion. Neck supple. No " thyromegaly present.   Cardiovascular: Normal rate, regular rhythm and normal heart sounds.   No murmur heard.  Pulmonary/Chest: Effort normal and breath sounds normal. No respiratory distress. He has no wheezes.   Abdominal: Soft. Bowel sounds are normal. He exhibits no distension. There is no tenderness.   Musculoskeletal: Normal range of motion.   Lymphadenopathy:     He has no cervical adenopathy.   Neurological: He is alert and oriented to person, place, and time.   Skin: Skin is warm. He is not diaphoretic.   Psychiatric: He has a normal mood and affect. His behavior is normal.       Assessment & Plan     Problem List Items Addressed This Visit        Other    Wellness examination - Primary    Relevant Orders    CBC auto differential    Comprehensive metabolic panel    Hemoglobin A1c    Lipid panel            Follow up if symptoms worsen or fail to improve.

## 2019-10-29 LAB
CHOLEST SERPL-MCNC: 253 MG/DL (ref 120–199)
CHOLEST/HDLC SERPL: 4.6 {RATIO} (ref 2–5)
ESTIMATED AVG GLUCOSE: 82 MG/DL (ref 68–131)
HBA1C MFR BLD HPLC: 4.5 % (ref 4–5.6)
HDLC SERPL-MCNC: 55 MG/DL (ref 40–75)
HDLC SERPL: 21.7 % (ref 20–50)
LDLC SERPL CALC-MCNC: 167 MG/DL (ref 63–159)
NONHDLC SERPL-MCNC: 198 MG/DL
TRIGL SERPL-MCNC: 155 MG/DL (ref 30–150)

## 2019-10-29 NOTE — PROGRESS NOTES
Your results have been reviewed. Your results are normal except for your cholesterol which is very high. Please limit your intake of saturated fats, high sodium foods and alcohol. Increase your intake of fruits, vegetables and good fats such as olive oil. Also exercise 4-5 times per week 45-50 minutes. Recommend tobacco cessation as well. If there is family history of early MI or heart disease may consider low dose cholesterol medication. If not, would recommend dietary modifications and exercise as stated above and can recheck lipid panel in one year. Please let us know if you have any questions.

## 2019-11-01 DIAGNOSIS — F98.8 ATTENTION DEFICIT DISORDER (ADD) WITHOUT HYPERACTIVITY: ICD-10-CM

## 2019-11-01 RX ORDER — DEXTROAMPHETAMINE SACCHARATE, AMPHETAMINE ASPARTATE, DEXTROAMPHETAMINE SULFATE AND AMPHETAMINE SULFATE 5; 5; 5; 5 MG/1; MG/1; MG/1; MG/1
1 TABLET ORAL 2 TIMES DAILY PRN
Qty: 60 TABLET | Refills: 0 | Status: SHIPPED | OUTPATIENT
Start: 2019-11-01 | End: 2019-11-29 | Stop reason: SDUPTHER

## 2019-11-29 DIAGNOSIS — F98.8 ATTENTION DEFICIT DISORDER (ADD) WITHOUT HYPERACTIVITY: ICD-10-CM

## 2019-11-29 RX ORDER — DEXTROAMPHETAMINE SACCHARATE, AMPHETAMINE ASPARTATE, DEXTROAMPHETAMINE SULFATE AND AMPHETAMINE SULFATE 5; 5; 5; 5 MG/1; MG/1; MG/1; MG/1
1 TABLET ORAL 2 TIMES DAILY PRN
Qty: 60 TABLET | Refills: 0 | Status: SHIPPED | OUTPATIENT
Start: 2019-11-29 | End: 2019-12-27 | Stop reason: SDUPTHER

## 2019-11-29 NOTE — TELEPHONE ENCOUNTER
Patient informed of his script being sent to the pharmacy and verbally understood the information given.

## 2019-12-27 ENCOUNTER — PATIENT MESSAGE (OUTPATIENT)
Dept: INTERNAL MEDICINE | Facility: CLINIC | Age: 34
End: 2019-12-27

## 2019-12-27 DIAGNOSIS — F98.8 ATTENTION DEFICIT DISORDER (ADD) WITHOUT HYPERACTIVITY: ICD-10-CM

## 2019-12-27 RX ORDER — DEXTROAMPHETAMINE SACCHARATE, AMPHETAMINE ASPARTATE, DEXTROAMPHETAMINE SULFATE AND AMPHETAMINE SULFATE 5; 5; 5; 5 MG/1; MG/1; MG/1; MG/1
1 TABLET ORAL 2 TIMES DAILY PRN
Qty: 60 TABLET | Refills: 0 | Status: SHIPPED | OUTPATIENT
Start: 2019-12-27 | End: 2020-01-28 | Stop reason: SDUPTHER

## 2020-01-27 DIAGNOSIS — F98.8 ATTENTION DEFICIT DISORDER (ADD) WITHOUT HYPERACTIVITY: ICD-10-CM

## 2020-01-27 PROBLEM — Z00.00 WELLNESS EXAMINATION: Status: RESOLVED | Noted: 2019-10-28 | Resolved: 2020-01-27

## 2020-01-27 RX ORDER — DEXTROAMPHETAMINE SACCHARATE, AMPHETAMINE ASPARTATE, DEXTROAMPHETAMINE SULFATE AND AMPHETAMINE SULFATE 5; 5; 5; 5 MG/1; MG/1; MG/1; MG/1
1 TABLET ORAL 2 TIMES DAILY PRN
Qty: 60 TABLET | Refills: 0 | Status: CANCELLED | OUTPATIENT
Start: 2020-01-27 | End: 2020-02-26

## 2020-01-28 ENCOUNTER — OFFICE VISIT (OUTPATIENT)
Dept: INTERNAL MEDICINE | Facility: CLINIC | Age: 35
End: 2020-01-28
Payer: COMMERCIAL

## 2020-01-28 VITALS
DIASTOLIC BLOOD PRESSURE: 78 MMHG | HEIGHT: 71 IN | SYSTOLIC BLOOD PRESSURE: 129 MMHG | HEART RATE: 85 BPM | OXYGEN SATURATION: 96 % | TEMPERATURE: 96 F | WEIGHT: 159.19 LBS | BODY MASS INDEX: 22.29 KG/M2

## 2020-01-28 DIAGNOSIS — F98.8 ATTENTION DEFICIT DISORDER (ADD) WITHOUT HYPERACTIVITY: ICD-10-CM

## 2020-01-28 PROCEDURE — 99213 PR OFFICE/OUTPT VISIT, EST, LEVL III, 20-29 MIN: ICD-10-PCS | Mod: S$GLB,,, | Performed by: FAMILY MEDICINE

## 2020-01-28 PROCEDURE — 99999 PR PBB SHADOW E&M-EST. PATIENT-LVL III: ICD-10-PCS | Mod: PBBFAC,,, | Performed by: FAMILY MEDICINE

## 2020-01-28 PROCEDURE — 3008F BODY MASS INDEX DOCD: CPT | Mod: CPTII,S$GLB,, | Performed by: FAMILY MEDICINE

## 2020-01-28 PROCEDURE — 3008F PR BODY MASS INDEX (BMI) DOCUMENTED: ICD-10-PCS | Mod: CPTII,S$GLB,, | Performed by: FAMILY MEDICINE

## 2020-01-28 PROCEDURE — 99213 OFFICE O/P EST LOW 20 MIN: CPT | Mod: S$GLB,,, | Performed by: FAMILY MEDICINE

## 2020-01-28 PROCEDURE — 99999 PR PBB SHADOW E&M-EST. PATIENT-LVL III: CPT | Mod: PBBFAC,,, | Performed by: FAMILY MEDICINE

## 2020-01-28 RX ORDER — DEXTROAMPHETAMINE SACCHARATE, AMPHETAMINE ASPARTATE, DEXTROAMPHETAMINE SULFATE AND AMPHETAMINE SULFATE 5; 5; 5; 5 MG/1; MG/1; MG/1; MG/1
1 TABLET ORAL 2 TIMES DAILY PRN
Qty: 60 TABLET | Refills: 0 | Status: SHIPPED | OUTPATIENT
Start: 2020-01-28 | End: 2020-02-25 | Stop reason: SDUPTHER

## 2020-02-24 DIAGNOSIS — F98.8 ATTENTION DEFICIT DISORDER (ADD) WITHOUT HYPERACTIVITY: ICD-10-CM

## 2020-02-24 RX ORDER — DEXTROAMPHETAMINE SACCHARATE, AMPHETAMINE ASPARTATE, DEXTROAMPHETAMINE SULFATE AND AMPHETAMINE SULFATE 5; 5; 5; 5 MG/1; MG/1; MG/1; MG/1
1 TABLET ORAL 2 TIMES DAILY PRN
Qty: 60 TABLET | Refills: 0 | Status: CANCELLED | OUTPATIENT
Start: 2020-02-24 | End: 2020-03-25

## 2020-02-25 ENCOUNTER — PATIENT MESSAGE (OUTPATIENT)
Dept: INTERNAL MEDICINE | Facility: CLINIC | Age: 35
End: 2020-02-25

## 2020-02-25 DIAGNOSIS — F98.8 ATTENTION DEFICIT DISORDER (ADD) WITHOUT HYPERACTIVITY: ICD-10-CM

## 2020-02-25 RX ORDER — DEXTROAMPHETAMINE SACCHARATE, AMPHETAMINE ASPARTATE, DEXTROAMPHETAMINE SULFATE AND AMPHETAMINE SULFATE 5; 5; 5; 5 MG/1; MG/1; MG/1; MG/1
1 TABLET ORAL 2 TIMES DAILY PRN
Qty: 60 TABLET | Refills: 0 | Status: SHIPPED | OUTPATIENT
Start: 2020-02-25 | End: 2020-03-24 | Stop reason: SDUPTHER

## 2020-02-25 RX ORDER — DEXTROAMPHETAMINE SACCHARATE, AMPHETAMINE ASPARTATE, DEXTROAMPHETAMINE SULFATE AND AMPHETAMINE SULFATE 5; 5; 5; 5 MG/1; MG/1; MG/1; MG/1
1 TABLET ORAL 2 TIMES DAILY PRN
Qty: 60 TABLET | Refills: 0 | Status: SHIPPED | OUTPATIENT
Start: 2020-02-25 | End: 2020-02-25 | Stop reason: SDUPTHER

## 2020-02-25 NOTE — TELEPHONE ENCOUNTER
[facility] just called and said that they are unable to fill my prescription and wouldn't be able to fill it until next week due to them moving locations. Can you please send the request out again to the [facility] pharmacy listed on my account? I will re-submit the request with the new Pharmacy selected as well.     Thanks,   -Aravind Lazo

## 2020-03-24 ENCOUNTER — PATIENT MESSAGE (OUTPATIENT)
Dept: INTERNAL MEDICINE | Facility: CLINIC | Age: 35
End: 2020-03-24

## 2020-03-24 DIAGNOSIS — F98.8 ATTENTION DEFICIT DISORDER (ADD) WITHOUT HYPERACTIVITY: ICD-10-CM

## 2020-03-24 RX ORDER — DEXTROAMPHETAMINE SACCHARATE, AMPHETAMINE ASPARTATE, DEXTROAMPHETAMINE SULFATE AND AMPHETAMINE SULFATE 5; 5; 5; 5 MG/1; MG/1; MG/1; MG/1
1 TABLET ORAL 2 TIMES DAILY PRN
Qty: 60 TABLET | Refills: 0 | Status: SHIPPED | OUTPATIENT
Start: 2020-03-24 | End: 2020-04-20 | Stop reason: SDUPTHER

## 2020-03-24 RX ORDER — DEXTROAMPHETAMINE SACCHARATE, AMPHETAMINE ASPARTATE, DEXTROAMPHETAMINE SULFATE AND AMPHETAMINE SULFATE 5; 5; 5; 5 MG/1; MG/1; MG/1; MG/1
1 TABLET ORAL 2 TIMES DAILY PRN
Qty: 60 TABLET | Refills: 0 | Status: SHIPPED | OUTPATIENT
Start: 2020-03-24 | End: 2020-03-24 | Stop reason: SDUPTHER

## 2020-03-24 NOTE — TELEPHONE ENCOUNTER
----- Message from Karthikeyan Linton sent at 3/24/2020 11:24 AM CDT -----  Contact: Darlyn with [facility]  Darlyn with [facility] called and would like to know if you can change the prescription of Adderall.    Darlyn can be reached at [phone]

## 2020-04-20 ENCOUNTER — OFFICE VISIT (OUTPATIENT)
Dept: INTERNAL MEDICINE | Facility: CLINIC | Age: 35
End: 2020-04-20
Payer: COMMERCIAL

## 2020-04-20 DIAGNOSIS — F98.8 ATTENTION DEFICIT DISORDER (ADD) WITHOUT HYPERACTIVITY: ICD-10-CM

## 2020-04-20 PROCEDURE — 99213 OFFICE O/P EST LOW 20 MIN: CPT | Mod: 95,,, | Performed by: FAMILY MEDICINE

## 2020-04-20 PROCEDURE — 99213 PR OFFICE/OUTPT VISIT, EST, LEVL III, 20-29 MIN: ICD-10-PCS | Mod: 95,,, | Performed by: FAMILY MEDICINE

## 2020-04-20 RX ORDER — DEXTROAMPHETAMINE SACCHARATE, AMPHETAMINE ASPARTATE, DEXTROAMPHETAMINE SULFATE AND AMPHETAMINE SULFATE 5; 5; 5; 5 MG/1; MG/1; MG/1; MG/1
1 TABLET ORAL 2 TIMES DAILY PRN
Qty: 60 TABLET | Refills: 0 | Status: SHIPPED | OUTPATIENT
Start: 2020-04-20 | End: 2020-05-18 | Stop reason: SDUPTHER

## 2020-04-20 NOTE — PROGRESS NOTES
Primary Care Telemedicine Note  The patient location is:  Patient Home   The chief complaint leading to consultation is: Attention Deficit Disorder  Total time spent with patient: 5 minutes    Visit type: Virtual visit with synchronous audio only and video  Each patient to whom he or she provides medical services by telemedicine is:  (1) informed of the relationship between the physician and patient and the respective role of any other health care provider with respect to management of the patient; and (2) notified that he or she may decline to receive medical services by telemedicine and may withdraw from such care at any time.    CC:Attention Deficit Disorder    HPI:  Due for 3 month follow-up on ADHD medication.  Has been doing well on the same dose for a while now.  No adverse effects to the medication.  No palpitations.  No weight loss.  Continues to feel that it is helpful with his work.    Problem List Items Addressed This Visit        Psychiatric    Attention deficit disorder (ADD) without hyperactivity    Relevant Medications    dextroamphetamine-amphetamine (ADDERALL) 20 mg tablet          The patient's Health Maintenance was reviewed and the following appears to be due at this time:  Health Maintenance Due   Topic Date Due    TETANUS VACCINE  05/01/2003    Pneumococcal Vaccine (Medium Risk) (1 of 1 - PPSV23) 05/01/2004       [unfilled]  Outpatient Medications Prior to Visit   Medication Sig Dispense Refill    dextroamphetamine-amphetamine (ADDERALL) 20 mg tablet Take 1 tablet by mouth 2 (two) times daily as needed. 60 tablet 0     No facility-administered medications prior to visit.         Physical Exam   No flowsheet data found.  No flowsheet data found.      Constitutional: The patient appears well-developed and well-nourished. No distress.   Psychiatric: The mood appears not anxious and the affect is not angry, not blunt, not labile and not inappropriate. Speech is not rapid and/or pressured, not  delayed, not tangential and not slurred. The patient is not agitated, not aggressive, is not hyperactive, not slowed, not withdrawn, not actively hallucinating and not combative. Thought content is not paranoid and not delusional. Cognition and memory are not impaired. The patient does not express impulsivity or inappropriate judgment and does not exhibit a depressed mood. The patient expresses no homicidal and no suicidal ideation and has no suicidal plans and no homicidal plans. The patient is communicative and exhibits a normal recent memory and normal remote memory. The patient is attentive.     Encounter Diagnosis   Name Primary?    Attention deficit disorder (ADD) without hyperactivity        PLAN:    I am having Matthew Lazo maintain his dextroamphetamine-amphetamine.    Matthew was seen today for adhd.    Diagnoses and all orders for this visit:    Attention deficit disorder (ADD) without hyperactivity  Comments:  -Doing well on Adderall. Will continue current dose of medication  -F/U 3 months  Orders:  -     dextroamphetamine-amphetamine (ADDERALL) 20 mg tablet; Take 1 tablet by mouth 2 (two) times daily as needed.        Medications Ordered This Encounter   Medications    dextroamphetamine-amphetamine (ADDERALL) 20 mg tablet     Sig: Take 1 tablet by mouth 2 (two) times daily as needed.     Dispense:  60 tablet     Refill:  0     Generic For:ADDERALL 20MG TABLET     Medications Ordered This Encounter   Medications    dextroamphetamine-amphetamine (ADDERALL) 20 mg tablet     Sig: Take 1 tablet by mouth 2 (two) times daily as needed.     Dispense:  60 tablet     Refill:  0     Generic For:ADDERALL 20MG TABLET     No orders of the defined types were placed in this encounter.

## 2020-05-18 ENCOUNTER — PATIENT MESSAGE (OUTPATIENT)
Dept: INTERNAL MEDICINE | Facility: CLINIC | Age: 35
End: 2020-05-18

## 2020-05-20 ENCOUNTER — PATIENT MESSAGE (OUTPATIENT)
Dept: INTERNAL MEDICINE | Facility: CLINIC | Age: 35
End: 2020-05-20

## 2020-07-17 ENCOUNTER — PATIENT MESSAGE (OUTPATIENT)
Dept: INTERNAL MEDICINE | Facility: CLINIC | Age: 35
End: 2020-07-17

## 2020-07-17 DIAGNOSIS — F98.8 ATTENTION DEFICIT DISORDER (ADD) WITHOUT HYPERACTIVITY: ICD-10-CM

## 2020-07-17 RX ORDER — DEXTROAMPHETAMINE SACCHARATE, AMPHETAMINE ASPARTATE, DEXTROAMPHETAMINE SULFATE AND AMPHETAMINE SULFATE 5; 5; 5; 5 MG/1; MG/1; MG/1; MG/1
1 TABLET ORAL 2 TIMES DAILY PRN
Qty: 60 TABLET | Refills: 0 | Status: SHIPPED | OUTPATIENT
Start: 2020-07-17 | End: 2020-08-13 | Stop reason: SDUPTHER

## 2020-08-13 ENCOUNTER — OFFICE VISIT (OUTPATIENT)
Dept: INTERNAL MEDICINE | Facility: CLINIC | Age: 35
End: 2020-08-13
Payer: COMMERCIAL

## 2020-08-13 DIAGNOSIS — F98.8 ATTENTION DEFICIT DISORDER (ADD) WITHOUT HYPERACTIVITY: ICD-10-CM

## 2020-08-13 DIAGNOSIS — F98.8 ATTENTION DEFICIT DISORDER (ADD) WITHOUT HYPERACTIVITY: Primary | ICD-10-CM

## 2020-08-13 PROCEDURE — 99213 PR OFFICE/OUTPT VISIT, EST, LEVL III, 20-29 MIN: ICD-10-PCS | Mod: 95,,, | Performed by: FAMILY MEDICINE

## 2020-08-13 PROCEDURE — 99213 OFFICE O/P EST LOW 20 MIN: CPT | Mod: 95,,, | Performed by: FAMILY MEDICINE

## 2020-08-13 RX ORDER — DEXTROAMPHETAMINE SACCHARATE, AMPHETAMINE ASPARTATE, DEXTROAMPHETAMINE SULFATE AND AMPHETAMINE SULFATE 5; 5; 5; 5 MG/1; MG/1; MG/1; MG/1
1 TABLET ORAL 2 TIMES DAILY PRN
Qty: 60 TABLET | Refills: 0 | Status: SHIPPED | OUTPATIENT
Start: 2020-08-13 | End: 2020-09-11 | Stop reason: SDUPTHER

## 2020-08-13 NOTE — PROGRESS NOTES
Primary Care Telemedicine Note  The patient location is:  Patient Home   The chief complaint leading to consultation is: Attention Deficit Disorder  Total time spent with patient:  8 min    Visit type: Virtual visit with synchronous audio only and video  Each patient to whom he or she provides medical services by telemedicine is:  (1) informed of the relationship between the physician and patient and the respective role of any other health care provider with respect to management of the patient; and (2) notified that he or she may decline to receive medical services by telemedicine and may withdraw from such care at any time.    CC:Attention Deficit Disorder  HPI:  Doing well on current dose of adderall w/o any negative side effects. Has been performing well at work.    Problem List Items Addressed This Visit        Psychiatric    Attention deficit disorder (ADD) without hyperactivity - Primary    Relevant Medications    dextroamphetamine-amphetamine (ADDERALL) 20 mg tablet          The patient's Health Maintenance was reviewed and the following appears to be due at this time:  Health Maintenance Due   Topic Date Due    Hepatitis C Screening  1985    TETANUS VACCINE  05/01/2003    Pneumococcal Vaccine (Medium Risk) (1 of 1 - PPSV23) 05/01/2004       [unfilled]  Outpatient Medications Prior to Visit   Medication Sig Dispense Refill    dextroamphetamine-amphetamine (ADDERALL) 20 mg tablet Take 1 tablet by mouth 2 (two) times daily as needed. 60 tablet 0     No facility-administered medications prior to visit.         Physical Exam   No flowsheet data found.  No flowsheet data found.      Constitutional: The patient appears well-developed and well-nourished. No distress.   Psychiatric: The mood appears not anxious and the affect is not angry, not blunt, not labile and not inappropriate. Speech is not rapid and/or pressured, not delayed, not tangential and not slurred. The patient is not agitated, not  aggressive, is not hyperactive, not slowed, not withdrawn, not actively hallucinating and not combative. Thought content is not paranoid and not delusional. Cognition and memory are not impaired. The patient does not express impulsivity or inappropriate judgment and does not exhibit a depressed mood. The patient expresses no homicidal and no suicidal ideation and has no suicidal plans and no homicidal plans. The patient is communicative and exhibits a normal recent memory and normal remote memory. The patient is attentive.     Encounter Diagnoses   Name Primary?    Attention deficit disorder (ADD) without hyperactivity Yes    Attention deficit disorder (ADD) without hyperactivity        PLAN:    I am having Matthew Lazo maintain his dextroamphetamine-amphetamine.    Matthew was seen today for adhd.    Diagnoses and all orders for this visit:    Attention deficit disorder (ADD) without hyperactivity  -     dextroamphetamine-amphetamine (ADDERALL) 20 mg tablet; Take 1 tablet by mouth 2 (two) times daily as needed.    Attention deficit disorder (ADD) without hyperactivity  Comments:  -Doing well on Adderall. Will continue current dose of medication  -F/U 3 months  Orders:  -     dextroamphetamine-amphetamine (ADDERALL) 20 mg tablet; Take 1 tablet by mouth 2 (two) times daily as needed.        Medications Ordered This Encounter   Medications    dextroamphetamine-amphetamine (ADDERALL) 20 mg tablet     Sig: Take 1 tablet by mouth 2 (two) times daily as needed.     Dispense:  60 tablet     Refill:  0     Generic For:ADDERALL 20MG TABLET     Medications Ordered This Encounter   Medications    dextroamphetamine-amphetamine (ADDERALL) 20 mg tablet     Sig: Take 1 tablet by mouth 2 (two) times daily as needed.     Dispense:  60 tablet     Refill:  0     Generic For:ADDERALL 20MG TABLET     No orders of the defined types were placed in this encounter.      Answers for HPI/ROS submitted by the patient on 8/13/2020    activity change: No  unexpected weight change: No  neck pain: No  hearing loss: No  rhinorrhea: No  trouble swallowing: No  eye discharge: No  visual disturbance: No  chest tightness: No  wheezing: No  chest pain: No  palpitations: No  blood in stool: No  constipation: No  vomiting: No  diarrhea: No  polydipsia: No  polyuria: No  difficulty urinating: No  urgency: No  hematuria: No  joint swelling: No  arthralgias: No  headaches: No  weakness: No  confusion: No  dysphoric mood: No

## 2020-09-11 DIAGNOSIS — F98.8 ATTENTION DEFICIT DISORDER (ADD) WITHOUT HYPERACTIVITY: ICD-10-CM

## 2020-09-11 RX ORDER — DEXTROAMPHETAMINE SACCHARATE, AMPHETAMINE ASPARTATE, DEXTROAMPHETAMINE SULFATE AND AMPHETAMINE SULFATE 5; 5; 5; 5 MG/1; MG/1; MG/1; MG/1
1 TABLET ORAL 2 TIMES DAILY PRN
Qty: 60 TABLET | Refills: 0 | Status: SHIPPED | OUTPATIENT
Start: 2020-09-11 | End: 2020-10-06

## 2020-11-06 ENCOUNTER — PATIENT MESSAGE (OUTPATIENT)
Dept: INTERNAL MEDICINE | Facility: CLINIC | Age: 35
End: 2020-11-06

## 2020-11-19 ENCOUNTER — OFFICE VISIT (OUTPATIENT)
Dept: INTERNAL MEDICINE | Facility: CLINIC | Age: 35
End: 2020-11-19
Payer: COMMERCIAL

## 2020-11-19 ENCOUNTER — LAB VISIT (OUTPATIENT)
Dept: LAB | Facility: HOSPITAL | Age: 35
End: 2020-11-19
Attending: FAMILY MEDICINE
Payer: COMMERCIAL

## 2020-11-19 VITALS
HEART RATE: 80 BPM | HEIGHT: 71 IN | TEMPERATURE: 98 F | OXYGEN SATURATION: 100 % | DIASTOLIC BLOOD PRESSURE: 78 MMHG | RESPIRATION RATE: 15 BRPM | WEIGHT: 148.13 LBS | BODY MASS INDEX: 20.74 KG/M2 | SYSTOLIC BLOOD PRESSURE: 112 MMHG

## 2020-11-19 DIAGNOSIS — F17.210 CIGARETTE NICOTINE DEPENDENCE WITHOUT COMPLICATION: ICD-10-CM

## 2020-11-19 DIAGNOSIS — Z00.00 ROUTINE HEALTH MAINTENANCE: ICD-10-CM

## 2020-11-19 DIAGNOSIS — Z11.59 ENCOUNTER FOR HEPATITIS C SCREENING TEST FOR LOW RISK PATIENT: ICD-10-CM

## 2020-11-19 DIAGNOSIS — Z00.00 ROUTINE HEALTH MAINTENANCE: Primary | ICD-10-CM

## 2020-11-19 LAB
ALBUMIN SERPL BCP-MCNC: 4.4 G/DL (ref 3.5–5.2)
ALP SERPL-CCNC: 103 U/L (ref 55–135)
ALT SERPL W/O P-5'-P-CCNC: 22 U/L (ref 10–44)
ANION GAP SERPL CALC-SCNC: 10 MMOL/L (ref 8–16)
AST SERPL-CCNC: 18 U/L (ref 10–40)
BASOPHILS # BLD AUTO: 0.02 K/UL (ref 0–0.2)
BASOPHILS NFR BLD: 0.3 % (ref 0–1.9)
BILIRUB SERPL-MCNC: 0.4 MG/DL (ref 0.1–1)
BUN SERPL-MCNC: 10 MG/DL (ref 6–20)
CALCIUM SERPL-MCNC: 8.7 MG/DL (ref 8.7–10.5)
CHLORIDE SERPL-SCNC: 101 MMOL/L (ref 95–110)
CO2 SERPL-SCNC: 26 MMOL/L (ref 23–29)
CREAT SERPL-MCNC: 1 MG/DL (ref 0.5–1.4)
DIFFERENTIAL METHOD: ABNORMAL
EOSINOPHIL # BLD AUTO: 0 K/UL (ref 0–0.5)
EOSINOPHIL NFR BLD: 0.1 % (ref 0–8)
ERYTHROCYTE [DISTWIDTH] IN BLOOD BY AUTOMATED COUNT: 13.1 % (ref 11.5–14.5)
EST. GFR  (AFRICAN AMERICAN): >60 ML/MIN/1.73 M^2
EST. GFR  (NON AFRICAN AMERICAN): >60 ML/MIN/1.73 M^2
GLUCOSE SERPL-MCNC: 91 MG/DL (ref 70–110)
HCT VFR BLD AUTO: 49.6 % (ref 40–54)
HGB BLD-MCNC: 16.8 G/DL (ref 14–18)
IMM GRANULOCYTES # BLD AUTO: 0.02 K/UL (ref 0–0.04)
IMM GRANULOCYTES NFR BLD AUTO: 0.3 % (ref 0–0.5)
LYMPHOCYTES # BLD AUTO: 1.9 K/UL (ref 1–4.8)
LYMPHOCYTES NFR BLD: 26.4 % (ref 18–48)
MCH RBC QN AUTO: 31.4 PG (ref 27–31)
MCHC RBC AUTO-ENTMCNC: 33.9 G/DL (ref 32–36)
MCV RBC AUTO: 93 FL (ref 82–98)
MONOCYTES # BLD AUTO: 0.6 K/UL (ref 0.3–1)
MONOCYTES NFR BLD: 7.8 % (ref 4–15)
NEUTROPHILS # BLD AUTO: 4.8 K/UL (ref 1.8–7.7)
NEUTROPHILS NFR BLD: 65.1 % (ref 38–73)
NRBC BLD-RTO: 0 /100 WBC
PLATELET # BLD AUTO: 244 K/UL (ref 150–350)
PMV BLD AUTO: 11.2 FL (ref 9.2–12.9)
POTASSIUM SERPL-SCNC: 4.1 MMOL/L (ref 3.5–5.1)
PROT SERPL-MCNC: 7.6 G/DL (ref 6–8.4)
RBC # BLD AUTO: 5.35 M/UL (ref 4.6–6.2)
SODIUM SERPL-SCNC: 137 MMOL/L (ref 136–145)
WBC # BLD AUTO: 7.34 K/UL (ref 3.9–12.7)

## 2020-11-19 PROCEDURE — 80053 COMPREHEN METABOLIC PANEL: CPT | Mod: PO

## 2020-11-19 PROCEDURE — 99999 PR PBB SHADOW E&M-EST. PATIENT-LVL IV: ICD-10-PCS | Mod: PBBFAC,,, | Performed by: FAMILY MEDICINE

## 2020-11-19 PROCEDURE — 3008F BODY MASS INDEX DOCD: CPT | Mod: CPTII,S$GLB,, | Performed by: FAMILY MEDICINE

## 2020-11-19 PROCEDURE — 3008F PR BODY MASS INDEX (BMI) DOCUMENTED: ICD-10-PCS | Mod: CPTII,S$GLB,, | Performed by: FAMILY MEDICINE

## 2020-11-19 PROCEDURE — 99395 PREV VISIT EST AGE 18-39: CPT | Mod: S$GLB,,, | Performed by: FAMILY MEDICINE

## 2020-11-19 PROCEDURE — 85025 COMPLETE CBC W/AUTO DIFF WBC: CPT | Mod: PO

## 2020-11-19 PROCEDURE — 1126F PR PAIN SEVERITY QUANTIFIED, NO PAIN PRESENT: ICD-10-PCS | Mod: S$GLB,,, | Performed by: FAMILY MEDICINE

## 2020-11-19 PROCEDURE — 86803 HEPATITIS C AB TEST: CPT

## 2020-11-19 PROCEDURE — 99999 PR PBB SHADOW E&M-EST. PATIENT-LVL IV: CPT | Mod: PBBFAC,,, | Performed by: FAMILY MEDICINE

## 2020-11-19 PROCEDURE — 1126F AMNT PAIN NOTED NONE PRSNT: CPT | Mod: S$GLB,,, | Performed by: FAMILY MEDICINE

## 2020-11-19 PROCEDURE — 99395 PR PREVENTIVE VISIT,EST,18-39: ICD-10-PCS | Mod: S$GLB,,, | Performed by: FAMILY MEDICINE

## 2020-11-19 PROCEDURE — 36415 COLL VENOUS BLD VENIPUNCTURE: CPT | Mod: PO

## 2020-11-19 PROCEDURE — 80061 LIPID PANEL: CPT

## 2020-11-20 LAB
CHOLEST SERPL-MCNC: 221 MG/DL (ref 120–199)
CHOLEST/HDLC SERPL: 4.2 {RATIO} (ref 2–5)
HCV AB SERPL QL IA: NEGATIVE
HDLC SERPL-MCNC: 53 MG/DL (ref 40–75)
HDLC SERPL: 24 % (ref 20–50)
LDLC SERPL CALC-MCNC: 145.2 MG/DL (ref 63–159)
NONHDLC SERPL-MCNC: 168 MG/DL
TRIGL SERPL-MCNC: 114 MG/DL (ref 30–150)

## 2020-11-23 PROBLEM — Z00.00 ROUTINE HEALTH MAINTENANCE: Status: ACTIVE | Noted: 2020-11-23

## 2020-11-23 NOTE — PROGRESS NOTES
"Subjective:       Patient ID: Matthew Lazo is a 35 y.o. male.    Chief Complaint: Annual Exam    HPI  Came in today for routine annual exam and also needing monitoring/refill of Adderall.  Has been doing well with no new complaints at this time.  Discussed smoking and pneumococcal vaccination.  He declines vaccine but will like to look into the smoking cessation program.    Family History   Problem Relation Age of Onset    No Known Problems Mother     Hypertension Father     Alcohol abuse Father     ADD / ADHD Sister     No Known Problems Brother     Cancer Paternal Grandfather     Leukemia Paternal Grandfather        Current Outpatient Medications:     dextroamphetamine-amphetamine (ADDERALL) 20 mg tablet, Take 1 tablet by mouth 2 (two) times a day., Disp: 60 tablet, Rfl: 0    Review of Systems   Constitutional: Negative for activity change and unexpected weight change.   HENT: Negative for hearing loss, rhinorrhea and trouble swallowing.    Eyes: Negative for discharge and visual disturbance.   Respiratory: Negative for chest tightness and wheezing.    Cardiovascular: Negative for chest pain and palpitations.   Gastrointestinal: Negative for blood in stool, constipation, diarrhea and vomiting.   Endocrine: Negative for polydipsia and polyuria.   Genitourinary: Negative for difficulty urinating, hematuria and urgency.   Musculoskeletal: Negative for arthralgias, joint swelling and neck pain.   Neurological: Negative for weakness and headaches.   Psychiatric/Behavioral: Negative for confusion and dysphoric mood.       Objective:   /78 (BP Location: Left arm, Patient Position: Sitting, BP Method: Medium (Manual))   Pulse 80   Temp 97.8 °F (36.6 °C) (Temporal)   Resp 15   Ht 5' 11" (1.803 m)   Wt 67.2 kg (148 lb 2.4 oz)   SpO2 100%   BMI 20.66 kg/m²      Physical Exam  Vitals signs reviewed.   Constitutional:       Appearance: He is well-developed.   HENT:      Head: Normocephalic and atraumatic. "   Eyes:      Conjunctiva/sclera: Conjunctivae normal.   Cardiovascular:      Rate and Rhythm: Normal rate.   Pulmonary:      Effort: Pulmonary effort is normal. No respiratory distress.   Skin:     General: Skin is warm and dry.      Findings: No rash.   Neurological:      Mental Status: He is alert and oriented to person, place, and time.      Coordination: Coordination normal.   Psychiatric:         Behavior: Behavior normal.         Assessment & Plan     Problem List Items Addressed This Visit        ID    Encounter for hepatitis C screening test for low risk patient    Relevant Orders    Hepatitis C Antibody (Completed)       Other    Nicotine dependence, uncomplicated    Relevant Orders    Ambulatory referral/consult to Smoking Cessation Program    Routine health maintenance - Primary    Current Assessment & Plan     No concerns from history nor PE. Getting him set up with smoking cessation and due for routine labs.          Relevant Orders    Comprehensive Metabolic Panel (Completed)    CBC Auto Differential (Completed)    Lipid Panel (Completed)            Immunizations Administered on Date of Encounter - 11/19/2020     No immunizations on file.           No follow-ups on file.    Disclaimer:  This note may have been prepared using voice recognition software, it may have not been extensively proofed, as such there could be errors within the text such as sound alike errors.

## 2020-11-23 NOTE — ASSESSMENT & PLAN NOTE
No concerns from history nor PE. Getting him set up with smoking cessation and due for routine labs.

## 2020-12-03 DIAGNOSIS — F98.8 ATTENTION DEFICIT DISORDER (ADD) WITHOUT HYPERACTIVITY: ICD-10-CM

## 2020-12-04 RX ORDER — DEXTROAMPHETAMINE SACCHARATE, AMPHETAMINE ASPARTATE, DEXTROAMPHETAMINE SULFATE AND AMPHETAMINE SULFATE 5; 5; 5; 5 MG/1; MG/1; MG/1; MG/1
1 TABLET ORAL 2 TIMES DAILY
Qty: 60 TABLET | Refills: 0 | Status: SHIPPED | OUTPATIENT
Start: 2020-12-04 | End: 2020-12-31 | Stop reason: SDUPTHER

## 2021-02-01 ENCOUNTER — PATIENT MESSAGE (OUTPATIENT)
Dept: INTERNAL MEDICINE | Facility: CLINIC | Age: 36
End: 2021-02-01

## 2021-02-01 DIAGNOSIS — F98.8 ATTENTION DEFICIT DISORDER (ADD) WITHOUT HYPERACTIVITY: ICD-10-CM

## 2021-02-01 RX ORDER — DEXTROAMPHETAMINE SACCHARATE, AMPHETAMINE ASPARTATE, DEXTROAMPHETAMINE SULFATE AND AMPHETAMINE SULFATE 5; 5; 5; 5 MG/1; MG/1; MG/1; MG/1
1 TABLET ORAL 2 TIMES DAILY
Qty: 60 TABLET | Refills: 0 | Status: SHIPPED | OUTPATIENT
Start: 2021-02-01 | End: 2021-03-01 | Stop reason: SDUPTHER

## 2021-02-02 ENCOUNTER — PATIENT MESSAGE (OUTPATIENT)
Dept: INTERNAL MEDICINE | Facility: CLINIC | Age: 36
End: 2021-02-02

## 2021-02-02 DIAGNOSIS — F98.8 ATTENTION DEFICIT DISORDER (ADD) WITHOUT HYPERACTIVITY: ICD-10-CM

## 2021-02-22 PROBLEM — Z00.00 ROUTINE HEALTH MAINTENANCE: Status: RESOLVED | Noted: 2020-11-23 | Resolved: 2021-02-22

## 2021-03-01 RX ORDER — DEXTROAMPHETAMINE SACCHARATE, AMPHETAMINE ASPARTATE, DEXTROAMPHETAMINE SULFATE AND AMPHETAMINE SULFATE 5; 5; 5; 5 MG/1; MG/1; MG/1; MG/1
1 TABLET ORAL 2 TIMES DAILY
Qty: 60 TABLET | Refills: 0 | Status: SHIPPED | OUTPATIENT
Start: 2021-03-01 | End: 2021-03-30 | Stop reason: SDUPTHER

## 2021-03-04 ENCOUNTER — OFFICE VISIT (OUTPATIENT)
Dept: INTERNAL MEDICINE | Facility: CLINIC | Age: 36
End: 2021-03-04
Payer: COMMERCIAL

## 2021-03-04 DIAGNOSIS — F98.8 ATTENTION DEFICIT DISORDER (ADD) WITHOUT HYPERACTIVITY: Primary | ICD-10-CM

## 2021-03-04 PROCEDURE — 99213 PR OFFICE/OUTPT VISIT, EST, LEVL III, 20-29 MIN: ICD-10-PCS | Mod: 95,,, | Performed by: FAMILY MEDICINE

## 2021-03-04 PROCEDURE — 99213 OFFICE O/P EST LOW 20 MIN: CPT | Mod: 95,,, | Performed by: FAMILY MEDICINE

## 2021-06-24 ENCOUNTER — PATIENT MESSAGE (OUTPATIENT)
Dept: INTERNAL MEDICINE | Facility: CLINIC | Age: 36
End: 2021-06-24

## 2021-07-19 ENCOUNTER — PATIENT MESSAGE (OUTPATIENT)
Dept: INTERNAL MEDICINE | Facility: CLINIC | Age: 36
End: 2021-07-19

## 2021-07-21 ENCOUNTER — OFFICE VISIT (OUTPATIENT)
Dept: INTERNAL MEDICINE | Facility: CLINIC | Age: 36
End: 2021-07-21
Payer: COMMERCIAL

## 2021-07-21 DIAGNOSIS — F98.8 ATTENTION DEFICIT DISORDER (ADD) WITHOUT HYPERACTIVITY: Primary | ICD-10-CM

## 2021-07-21 DIAGNOSIS — F17.210 CIGARETTE NICOTINE DEPENDENCE WITHOUT COMPLICATION: ICD-10-CM

## 2021-07-21 DIAGNOSIS — F98.8 ATTENTION DEFICIT DISORDER (ADD) WITHOUT HYPERACTIVITY: ICD-10-CM

## 2021-07-21 PROCEDURE — 99213 PR OFFICE/OUTPT VISIT, EST, LEVL III, 20-29 MIN: ICD-10-PCS | Mod: 95,,, | Performed by: NURSE PRACTITIONER

## 2021-07-21 PROCEDURE — 99213 OFFICE O/P EST LOW 20 MIN: CPT | Mod: 95,,, | Performed by: NURSE PRACTITIONER

## 2021-07-21 RX ORDER — DEXTROAMPHETAMINE SACCHARATE, AMPHETAMINE ASPARTATE, DEXTROAMPHETAMINE SULFATE AND AMPHETAMINE SULFATE 5; 5; 5; 5 MG/1; MG/1; MG/1; MG/1
1 TABLET ORAL 2 TIMES DAILY
Qty: 60 TABLET | Refills: 0 | Status: SHIPPED | OUTPATIENT
Start: 2021-07-21 | End: 2021-08-19 | Stop reason: SDUPTHER

## 2021-08-19 DIAGNOSIS — F98.8 ATTENTION DEFICIT DISORDER (ADD) WITHOUT HYPERACTIVITY: ICD-10-CM

## 2021-08-19 RX ORDER — DEXTROAMPHETAMINE SACCHARATE, AMPHETAMINE ASPARTATE, DEXTROAMPHETAMINE SULFATE AND AMPHETAMINE SULFATE 5; 5; 5; 5 MG/1; MG/1; MG/1; MG/1
1 TABLET ORAL 2 TIMES DAILY
Qty: 60 TABLET | Refills: 0 | Status: SHIPPED | OUTPATIENT
Start: 2021-08-19 | End: 2021-09-17 | Stop reason: SDUPTHER

## 2021-09-16 ENCOUNTER — PATIENT MESSAGE (OUTPATIENT)
Dept: INTERNAL MEDICINE | Facility: CLINIC | Age: 36
End: 2021-09-16

## 2021-09-16 DIAGNOSIS — F98.8 ATTENTION DEFICIT DISORDER (ADD) WITHOUT HYPERACTIVITY: ICD-10-CM

## 2021-09-17 DIAGNOSIS — F98.8 ATTENTION DEFICIT DISORDER (ADD) WITHOUT HYPERACTIVITY: ICD-10-CM

## 2021-09-17 RX ORDER — DEXTROAMPHETAMINE SACCHARATE, AMPHETAMINE ASPARTATE, DEXTROAMPHETAMINE SULFATE AND AMPHETAMINE SULFATE 5; 5; 5; 5 MG/1; MG/1; MG/1; MG/1
1 TABLET ORAL 2 TIMES DAILY
Qty: 60 TABLET | Refills: 0 | OUTPATIENT
Start: 2021-09-17

## 2021-09-17 RX ORDER — DEXTROAMPHETAMINE SACCHARATE, AMPHETAMINE ASPARTATE, DEXTROAMPHETAMINE SULFATE AND AMPHETAMINE SULFATE 5; 5; 5; 5 MG/1; MG/1; MG/1; MG/1
1 TABLET ORAL 2 TIMES DAILY
Qty: 60 TABLET | Refills: 0 | Status: SHIPPED | OUTPATIENT
Start: 2021-09-17

## 2021-10-14 ENCOUNTER — PATIENT MESSAGE (OUTPATIENT)
Dept: INTERNAL MEDICINE | Facility: CLINIC | Age: 36
End: 2021-10-14
Payer: COMMERCIAL